# Patient Record
Sex: MALE | Race: WHITE | NOT HISPANIC OR LATINO | Employment: UNEMPLOYED | URBAN - METROPOLITAN AREA
[De-identification: names, ages, dates, MRNs, and addresses within clinical notes are randomized per-mention and may not be internally consistent; named-entity substitution may affect disease eponyms.]

---
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
## 2023-07-26 ENCOUNTER — TELEPHONE (OUTPATIENT)
Age: 18
End: 2023-07-26

## 2023-07-31 NOTE — TELEPHONE ENCOUNTER
07/31/23 1:17 AM        The office's request has been received, reviewed, and the patient chart updated. The PCP has successfully been removed with a patient attribution note. This message will now be completed.         Thank you  Conner Aleman

## 2024-03-07 ENCOUNTER — HOSPITAL ENCOUNTER (EMERGENCY)
Facility: HOSPITAL | Age: 19
Discharge: HOME | End: 2024-03-07
Attending: EMERGENCY MEDICINE
Payer: COMMERCIAL

## 2024-03-07 ENCOUNTER — APPOINTMENT (EMERGENCY)
Dept: RADIOLOGY | Facility: HOSPITAL | Age: 19
End: 2024-03-07
Payer: COMMERCIAL

## 2024-03-07 ENCOUNTER — OFFICE VISIT (OUTPATIENT)
Age: 19
End: 2024-03-07
Payer: COMMERCIAL

## 2024-03-07 VITALS
TEMPERATURE: 97.5 F | DIASTOLIC BLOOD PRESSURE: 92 MMHG | SYSTOLIC BLOOD PRESSURE: 142 MMHG | OXYGEN SATURATION: 97 % | RESPIRATION RATE: 20 BRPM | HEART RATE: 72 BPM

## 2024-03-07 VITALS
DIASTOLIC BLOOD PRESSURE: 89 MMHG | HEART RATE: 70 BPM | OXYGEN SATURATION: 99 % | RESPIRATION RATE: 18 BRPM | TEMPERATURE: 96.7 F | SYSTOLIC BLOOD PRESSURE: 138 MMHG

## 2024-03-07 DIAGNOSIS — I45.6 WOLFF-PARKINSON-WHITE (WPW) PATTERN: ICD-10-CM

## 2024-03-07 DIAGNOSIS — R07.9 CHEST PAIN, UNSPECIFIED TYPE: Primary | ICD-10-CM

## 2024-03-07 DIAGNOSIS — R07.9 CHEST PAIN: Primary | ICD-10-CM

## 2024-03-07 DIAGNOSIS — I45.6 WPW (WOLFF-PARKINSON-WHITE SYNDROME): ICD-10-CM

## 2024-03-07 LAB
2HR DELTA HS TROPONIN: >0 NG/L
ALBUMIN SERPL BCP-MCNC: 4.5 G/DL (ref 3.5–5)
ALP SERPL-CCNC: 73 U/L (ref 34–104)
ALT SERPL W P-5'-P-CCNC: 31 U/L (ref 7–52)
ANION GAP SERPL CALCULATED.3IONS-SCNC: 8 MMOL/L
AST SERPL W P-5'-P-CCNC: 23 U/L (ref 13–39)
ATRIAL RATE: 81 BPM
BASOPHILS # BLD AUTO: 0.07 THOUSANDS/ÂΜL (ref 0–0.1)
BASOPHILS NFR BLD AUTO: 1 % (ref 0–1)
BILIRUB SERPL-MCNC: 0.98 MG/DL (ref 0.2–1)
BUN SERPL-MCNC: 11 MG/DL (ref 5–25)
CALCIUM SERPL-MCNC: 9.8 MG/DL (ref 8.4–10.2)
CARDIAC TROPONIN I PNL SERPL HS: 2 NG/L
CARDIAC TROPONIN I PNL SERPL HS: <2 NG/L
CHLORIDE SERPL-SCNC: 107 MMOL/L (ref 96–108)
CO2 SERPL-SCNC: 23 MMOL/L (ref 21–32)
CREAT SERPL-MCNC: 0.83 MG/DL (ref 0.6–1.3)
D DIMER PPP FEU-MCNC: 0.31 UG/ML FEU
EOSINOPHIL # BLD AUTO: 0.55 THOUSAND/ÂΜL (ref 0–0.61)
EOSINOPHIL NFR BLD AUTO: 7 % (ref 0–6)
ERYTHROCYTE [DISTWIDTH] IN BLOOD BY AUTOMATED COUNT: 12.7 % (ref 11.6–15.1)
GFR SERPL CREATININE-BSD FRML MDRD: 125 ML/MIN/1.73SQ M
GLUCOSE SERPL-MCNC: 83 MG/DL (ref 65–140)
HCT VFR BLD AUTO: 45 % (ref 36.5–49.3)
HGB BLD-MCNC: 15.8 G/DL (ref 12–17)
IMM GRANULOCYTES # BLD AUTO: 0.11 THOUSAND/UL (ref 0–0.2)
IMM GRANULOCYTES NFR BLD AUTO: 1 % (ref 0–2)
LYMPHOCYTES # BLD AUTO: 2.6 THOUSANDS/ÂΜL (ref 0.6–4.47)
LYMPHOCYTES NFR BLD AUTO: 33 % (ref 14–44)
MCH RBC QN AUTO: 29.2 PG (ref 26.8–34.3)
MCHC RBC AUTO-ENTMCNC: 35.1 G/DL (ref 31.4–37.4)
MCV RBC AUTO: 83 FL (ref 82–98)
MONOCYTES # BLD AUTO: 0.54 THOUSAND/ÂΜL (ref 0.17–1.22)
MONOCYTES NFR BLD AUTO: 7 % (ref 4–12)
NEUTROPHILS # BLD AUTO: 4.12 THOUSANDS/ÂΜL (ref 1.85–7.62)
NEUTS SEG NFR BLD AUTO: 51 % (ref 43–75)
NRBC BLD AUTO-RTO: 0 /100 WBCS
P AXIS: 46 DEGREES
PLATELET # BLD AUTO: 309 THOUSANDS/UL (ref 149–390)
PMV BLD AUTO: 9.9 FL (ref 8.9–12.7)
POTASSIUM SERPL-SCNC: 3.9 MMOL/L (ref 3.5–5.3)
PR INTERVAL: 96 MS
PROT SERPL-MCNC: 7.7 G/DL (ref 6.4–8.4)
QRS AXIS: 3 DEGREES
QRSD INTERVAL: 104 MS
QT INTERVAL: 382 MS
QTC INTERVAL: 443 MS
RBC # BLD AUTO: 5.41 MILLION/UL (ref 3.88–5.62)
SODIUM SERPL-SCNC: 138 MMOL/L (ref 135–147)
T WAVE AXIS: 43 DEGREES
VENTRICULAR RATE: 81 BPM
WBC # BLD AUTO: 7.99 THOUSAND/UL (ref 4.31–10.16)

## 2024-03-07 PROCEDURE — 85025 COMPLETE CBC W/AUTO DIFF WBC: CPT | Performed by: EMERGENCY MEDICINE

## 2024-03-07 PROCEDURE — 71045 X-RAY EXAM CHEST 1 VIEW: CPT

## 2024-03-07 PROCEDURE — 36415 COLL VENOUS BLD VENIPUNCTURE: CPT

## 2024-03-07 PROCEDURE — 93005 ELECTROCARDIOGRAM TRACING: CPT

## 2024-03-07 PROCEDURE — 99213 OFFICE O/P EST LOW 20 MIN: CPT | Performed by: PHYSICIAN ASSISTANT

## 2024-03-07 PROCEDURE — 80053 COMPREHEN METABOLIC PANEL: CPT | Performed by: EMERGENCY MEDICINE

## 2024-03-07 PROCEDURE — 84484 ASSAY OF TROPONIN QUANT: CPT | Performed by: EMERGENCY MEDICINE

## 2024-03-07 PROCEDURE — 96372 THER/PROPH/DIAG INJ SC/IM: CPT

## 2024-03-07 PROCEDURE — 99284 EMERGENCY DEPT VISIT MOD MDM: CPT | Performed by: EMERGENCY MEDICINE

## 2024-03-07 PROCEDURE — 99285 EMERGENCY DEPT VISIT HI MDM: CPT

## 2024-03-07 PROCEDURE — 85379 FIBRIN DEGRADATION QUANT: CPT | Performed by: EMERGENCY MEDICINE

## 2024-03-07 PROCEDURE — 93010 ELECTROCARDIOGRAM REPORT: CPT | Performed by: INTERNAL MEDICINE

## 2024-03-07 RX ORDER — KETOROLAC TROMETHAMINE 30 MG/ML
30 INJECTION, SOLUTION INTRAMUSCULAR; INTRAVENOUS ONCE
Status: COMPLETED | OUTPATIENT
Start: 2024-03-07 | End: 2024-03-07

## 2024-03-07 RX ADMIN — KETOROLAC TROMETHAMINE 30 MG: 30 INJECTION, SOLUTION INTRAMUSCULAR; INTRAVENOUS at 15:06

## 2024-03-07 NOTE — Clinical Note
Dedrick Cazares was seen and treated in our emergency department on 3/7/2024.    No restrictions            Diagnosis:     Dedrick  may return to work on return date.    He may return on this date: 03/11/2024         If you have any questions or concerns, please don't hesitate to call.      Gisela Vazquez, DO    ______________________________           _______________          _______________  Hospital Representative                              Date                                Time

## 2024-03-07 NOTE — ED PROVIDER NOTES
History  Chief Complaint   Patient presents with    Chest Pain     Patient reports chest pain and shortness of breath for the last day. Patient reports he is otherwise healthy individual. Patient sent in by urgent care.      20 y/o male presents to the ED for chest pain and sob since yesterday. 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 States that he was clearing brush when he developed pain in the center of his chest. States that it was a sharp/ stabbing pain with associated sob. States that it lasted about 20 mins and then resolved. He states that it has been intermittent since. Not associated with exertion. Does state that he lifts heavy brush at work so unsure if he strained a muscle. Denies any fever, cough, congestion, sore throat, urinary symptoms, or d/c. Has not tried anything for the symptoms. States that it went to urgent care today and sent here for abnormal EKG with WPW. Denies any cardiac hx or smoking hx. States that his uncle had a pacemaker placed at 16 but is unsure why.       History provided by:  Patient  Chest Pain  Pain location:  Substernal area  Pain radiates to:  Does not radiate  Onset quality:  Sudden  Duration:  1 day  Timing:  Intermittent  Progression:  Improving  Chronicity:  New  Relieved by:  None tried  Worsened by:  Nothing tried  Ineffective treatments:  None tried  Associated symptoms: shortness of breath    Associated symptoms: no abdominal pain, no back pain, no cough, no fever, no headache, no nausea, no numbness, not vomiting and no weakness    Risk factors: no coronary artery disease, no hypertension and no smoking        Prior to Admission Medications   Prescriptions Last Dose Informant Patient Reported? Taking?   Benzoyl Peroxide (BENZOYL PEROXIDE CLEANSER) 9 % LOTN   No No   Sig: Cleanse back daily   cetirizine (ZyrTEC) 10 mg tablet   Yes No   Sig: Take 10 mg by mouth as needed    fluticasone (FLONASE) 50 mcg/act nasal spray  Self No No   Si  spray into each nostril daily   Patient taking differently: 1 spray into each nostril daily as needed        Facility-Administered Medications: None       Past Medical History:   Diagnosis Date    Allergic rhinitis        Past Surgical History:   Procedure Laterality Date    CIRCUMCISION      HYDROCELE EXCISION / REPAIR  2011       Family History   Problem Relation Age of Onset    Asthma Mother     Allergy (severe) Mother     Psoriasis Mother     Obesity Mother         had gastric sleeve    No Known Problems Father     Allergy (severe) Sister     Allergy (severe) Brother     Heart disease Maternal Grandmother     Hypertension Maternal Grandmother     Asthma Maternal Grandfather     Cancer Maternal Grandfather     COPD Maternal Grandfather     Hypertension Paternal Grandmother     Hypertension Paternal Grandfather     Heart disease Paternal Uncle     Hypertension Paternal Uncle     Hyperlipidemia Paternal Uncle      I have reviewed and agree with the history as documented.    E-Cigarette/Vaping    E-Cigarette Use Never User      E-Cigarette/Vaping Substances    Nicotine No      Social History     Tobacco Use    Smoking status: Never    Smokeless tobacco: Never   Vaping Use    Vaping status: Never Used   Substance Use Topics    Alcohol use: Never    Drug use: Never       Review of Systems   Constitutional:  Negative for chills and fever.   HENT:  Negative for congestion, ear pain and sore throat.    Eyes:  Negative for pain and visual disturbance.   Respiratory:  Positive for shortness of breath. Negative for cough and wheezing.    Cardiovascular:  Positive for chest pain. Negative for leg swelling.   Gastrointestinal:  Negative for abdominal pain, diarrhea, nausea and vomiting.   Genitourinary:  Negative for dysuria, frequency, hematuria and urgency.   Musculoskeletal:  Negative for back pain, neck pain and neck stiffness.   Skin:  Negative for rash and wound.   Neurological:  Negative for weakness, numbness and  headaches.   Psychiatric/Behavioral:  Negative for agitation and confusion.    All other systems reviewed and are negative.      Physical Exam  Physical Exam  Vitals and nursing note reviewed.   Constitutional:       Appearance: He is well-developed.   HENT:      Head: Normocephalic and atraumatic.   Eyes:      Pupils: Pupils are equal, round, and reactive to light.   Cardiovascular:      Rate and Rhythm: Normal rate and regular rhythm.   Pulmonary:      Effort: Pulmonary effort is normal.      Breath sounds: Normal breath sounds.   Chest:      Chest wall: Tenderness present.   Abdominal:      General: Bowel sounds are normal.      Palpations: Abdomen is soft.   Musculoskeletal:         General: Normal range of motion.      Cervical back: Normal range of motion and neck supple.   Skin:     General: Skin is warm and dry.   Neurological:      General: No focal deficit present.      Mental Status: He is alert and oriented to person, place, and time.      Comments: No focal deficits         Vital Signs  ED Triage Vitals   Temperature Pulse Respirations Blood Pressure SpO2   03/07/24 1249 03/07/24 1249 03/07/24 1249 03/07/24 1249 03/07/24 1249   97.5 °F (36.4 °C) 79 16 159/98 97 %      Temp Source Heart Rate Source Patient Position - Orthostatic VS BP Location FiO2 (%)   03/07/24 1249 03/07/24 1249 03/07/24 1249 03/07/24 1249 --   Oral Monitor Sitting Left arm       Pain Score       03/07/24 1506       5           Vitals:    03/07/24 1249 03/07/24 1400 03/07/24 1600   BP: 159/98 147/80 142/92   Pulse: 79 67 72   Patient Position - Orthostatic VS: Sitting Sitting Sitting         Visual Acuity      ED Medications  Medications   ketorolac (TORADOL) injection 30 mg (30 mg Intramuscular Given 3/7/24 1506)       Diagnostic Studies  Results Reviewed       Procedure Component Value Units Date/Time    HS Troponin I 2hr [[id number]]  (Normal) Collected: 03/07/24 1501    Lab Status: Final result Specimen: Blood from Arm, Right  Updated: 03/07/24 1532     hs TnI 2hr 2 ng/L      Delta 2hr hsTnI >0 ng/L     D-dimer, quantitative [[id number]]  (Normal) Collected: 03/07/24 1249    Lab Status: Final result Specimen: Blood from Arm, Right Updated: 03/07/24 1403     D-Dimer, Quant 0.31 ug/ml FEU     HS Troponin 0hr (reflex protocol) [[id number]]  (Normal) Collected: 03/07/24 1249    Lab Status: Final result Specimen: Blood from Arm, Right Updated: 03/07/24 1318     hs TnI 0hr <2 ng/L     Comprehensive metabolic panel [[id number]] Collected: 03/07/24 1249    Lab Status: Final result Specimen: Blood from Arm, Right Updated: 03/07/24 1311     Sodium 138 mmol/L      Potassium 3.9 mmol/L      Chloride 107 mmol/L      CO2 23 mmol/L      ANION GAP 8 mmol/L      BUN 11 mg/dL      Creatinine 0.83 mg/dL      Glucose 83 mg/dL      Calcium 9.8 mg/dL      AST 23 U/L      ALT 31 U/L      Alkaline Phosphatase 73 U/L      Total Protein 7.7 g/dL      Albumin 4.5 g/dL      Total Bilirubin 0.98 mg/dL      eGFR 125 ml/min/1.73sq m     Narrative:      National Kidney Disease Foundation guidelines for Chronic Kidney Disease (CKD):     Stage 1 with normal or high GFR (GFR > 90 mL/min/1.73 square meters)    Stage 2 Mild CKD (GFR = 60-89 mL/min/1.73 square meters)    Stage 3A Moderate CKD (GFR = 45-59 mL/min/1.73 square meters)    Stage 3B Moderate CKD (GFR = 30-44 mL/min/1.73 square meters)    Stage 4 Severe CKD (GFR = 15-29 mL/min/1.73 square meters)    Stage 5 End Stage CKD (GFR <15 mL/min/1.73 square meters)  Note: GFR calculation is accurate only with a steady state creatinine    CBC and differential [[id number]]  (Abnormal) Collected: 03/07/24 1249    Lab Status: Final result Specimen: Blood from Arm, Right Updated: 03/07/24 1301     WBC 7.99 Thousand/uL      RBC 5.41 Million/uL      Hemoglobin 15.8 g/dL      Hematocrit 45.0 %      MCV 83 fL      MCH 29.2 pg      MCHC 35.1 g/dL      RDW 12.7 %      MPV 9.9 fL      Platelets 309 Thousands/uL      nRBC 0 /100 WBCs       Neutrophils Relative 51 %      Immat GRANS % 1 %      Lymphocytes Relative 33 %      Monocytes Relative 7 %      Eosinophils Relative 7 %      Basophils Relative 1 %      Neutrophils Absolute 4.12 Thousands/µL      Immature Grans Absolute 0.11 Thousand/uL      Lymphocytes Absolute 2.60 Thousands/µL      Monocytes Absolute 0.54 Thousand/µL      Eosinophils Absolute 0.55 Thousand/µL      Basophils Absolute 0.07 Thousands/µL                    XR chest 1 view portable   Final Result by Matty Dodd MD (03/07 1418)      No acute cardiopulmonary disease.            Resident: COURTNEY CISNEROS I, the attending radiologist, have reviewed the images and agree with the final report above.      Workstation performed: [id number]                    Procedures  ECG 12 Lead Documentation Only    Date/Time: 3/7/2024 1:45 PM    Performed by: Gisela Vazquez DO  Authorized by: Gisela Vazquez DO    Indications / Diagnosis:  Chest pain and sob  Patient location:  ED  Rate:     ECG rate:  81    ECG rate assessment: normal    Rhythm:     Rhythm: sinus rhythm    Ectopy:     Ectopy: none    QRS:     QRS axis:  Normal    QRS intervals:  Normal  ST segments:     ST segments:  Normal  T waves:     T waves: normal    Comments:      WPW   ECG 12 Lead Documentation Only    Date/Time: 3/7/2024 3:46 PM    Performed by: Gisela Vazquez DO  Authorized by: Gisela Vazquez DO    Indications / Diagnosis:  Delta  Patient location:  ED  Rate:     ECG rate:  69    ECG rate assessment: normal    Rhythm:     Rhythm: sinus rhythm    Ectopy:     Ectopy: none    QRS:     QRS axis:  Normal  Conduction:     Conduction: normal    ST segments:     ST segments:  Normal  T waves:     T waves: normal             ED Course  ED Course as of 03/07/24 1950   Thu Mar 07, 2024   1342 Chest pain and sob - yesterday around 10a - working pulling brush for trees. Stabbing. Intermittent. 20 mins. Eating dinner. Sitting in truck. Sob with it. Center of chest. Does   heavy brush at work so may have injured or strained a muscle. Uncle had a pacemaker put in early in life in early 20s./    1438 Spoke with Dr Solomon from cards- states nothing to do further for WPW if not in svt. Recommends outpatient follow up for wpw ep referral              HEART Risk Score      Flowsheet Row Most Recent Value   Heart Score Risk Calculator    History 0 Filed at: 03/07/2024 1549   ECG 0 Filed at: 03/07/2024 1549   Age 0 Filed at: 03/07/2024 1549   Risk Factors 1 Filed at: 03/07/2024 1549   Troponin 0 Filed at: 03/07/2024 1549   HEART Score 1 Filed at: 03/07/2024 1549                          SBIRT 20yo+      Flowsheet Row Most Recent Value   Initial Alcohol Screen: US AUDIT-C     1. How often do you have a drink containing alcohol? 0 Filed at: 03/07/2024 1432   2. How many drinks containing alcohol do you have on a typical day you are drinking?  0 Filed at: 03/07/2024 1432   3a. Male UNDER 65: How often do you have five or more drinks on one occasion? 0 Filed at: 03/07/2024 1432   Audit-C Score 0 Filed at: 03/07/2024 1432   LEODAN: How many times in the past year have you...    Used an illegal drug or used a prescription medication for non-medical reasons? Never Filed at: 03/07/2024 1432                      Medical Decision Making  20 y/o male presents to the ED for chest pain and sob - will get cardiac workup, EKG, trop, ddimer, and cxr. Will also speak to cards about wpw on EKG.    Spoke with Dr Solomon, recommends outpatient follow up for wpw EP referral.     Amount and/or Complexity of Data Reviewed  Labs: ordered.  Radiology: ordered.    Risk  Prescription drug management.             Disposition  Final diagnoses:   Chest pain   WPW (Rubio-Parkinson-White syndrome)     Time reflects when diagnosis was documented in both MDM as applicable and the Disposition within this note       Time User Action Codes Description Comment    3/7/2024  2:40 PM Gisela Vazquez Add [R07.9] Chest pain      3/7/2024  2:40 PM Gisela Vazquez Add [I45.6] WPW (Rubio-Parkinson-White syndrome)           ED Disposition       ED Disposition   Discharge    Condition   Stable    Date/Time   Thu Mar 7, 2024 1536    Comment   Dedrick Cazares discharge to home/self care.                   Follow-up Information       Follow up With Specialties Details Why Contact Info Additional Information    [facility] Cardiology Call in 1 day for follow up within 1 week [address]  [address]  [facility] [address] [phone] [facility], [address], [facility] [address], [place], [place], [address] [phone]    [facility] Emergency Medicine Go to  immediately for any new or wrosening symptoms, as discussed [address] [facility] [address] [phone] [facility], [address] [place], [place], [address]            Discharge Medication List as of 3/7/2024  3:54 PM        CONTINUE these medications which have NOT CHANGED    Details   Benzoyl Peroxide (BENZOYL PEROXIDE CLEANSER) 9 % LOTN Cleanse back daily, Normal      cetirizine (ZyrTEC) 10 mg tablet Take 10 mg by mouth as needed , Historical Med      fluticasone (FLONASE) 50 mcg/act nasal spray 1 spray into each nostril daily, Starting Wed 10/28/2020, Normal                 PDMP Review       None            ED Provider  Electronically Signed by             Gisela Vazquez DO  03/07/24 1951

## 2024-03-07 NOTE — PROGRESS NOTES
[facility]        NAME: Dedrick Cazares is a 19 y.o. male  : 2005    MRN: [id number]  DATE: 2024  TIME: 12:31 PM    Assessment and Plan   Chest pain, unspecified type [R07.9]  1. Chest pain, unspecified type  Transfer to other facility      2. Rubio-Parkinson-White (WPW) pattern  Transfer to other facility            Patient Instructions     Discussed ECG findings indicating with Parkinson.  Briefly discussed or Parkinson syndrome.  Recommend being transferred to the ED via ambulance however the patient declined and reported that he will drive himself to the [facility] ED.    Inform patient the risk of self driving to the emergency department in light of chest pain with shortness of breath such as losing consciousness while driving.    Patient nevertheless, declined and will drive himself to the emergency department.    VSS, In no acute cardiopulmonary distress.      Chief Complaint     Chief Complaint   Patient presents with    Chest Pain     Patient stated he had chest pain yesterday and today having a hard time breathing. C/o fatigue.          History of Present Illness       19-year-old male is presenting today with complaint of chest pain with shortness of breath in the last 48 hours.  Denies a past medical history of cardiac issues.  Denies family history of cardiac issues as well.  Patient was at work yesterday while he began to experience intermitting chest pain and occasional shortness of breath.  He currently denies dizziness, lightheadedness, weakness fatigue or nausea or vomiting.        Review of Systems   Review of Systems   Constitutional:  Negative for fever.   Respiratory:  Negative for cough.    Cardiovascular:  Positive for chest pain and palpitations.   Gastrointestinal:  Negative for nausea and vomiting.   Neurological:  Negative for dizziness, weakness, light-headedness, numbness and headaches.         Current Medications       Current Outpatient  Medications:     Benzoyl Peroxide (BENZOYL PEROXIDE CLEANSER) 9 % LOTN, Cleanse back daily, Disp: 1 Bottle, Rfl: 6    cetirizine (ZyrTEC) 10 mg tablet, Take 10 mg by mouth as needed , Disp: , Rfl:     fluticasone (FLONASE) 50 mcg/act nasal spray, 1 spray into each nostril daily (Patient taking differently: 1 spray into each nostril daily as needed  ), Disp: 16 g, Rfl: 6    Current Allergies     Allergies as of 03/07/2024 - Reviewed 03/07/2024   Allergen Reaction Noted    Amoxicillin Rash 07/08/2018            The following portions of the patient's history were reviewed and updated as appropriate: allergies, current medications, past family history, past medical history, past social history, past surgical history and problem list.     Past Medical History:   Diagnosis Date    Allergic rhinitis        Past Surgical History:   Procedure Laterality Date    CIRCUMCISION      HYDROCELE EXCISION / REPAIR  2011       Family History   Problem Relation Age of Onset    Asthma Mother     Allergy (severe) Mother     Psoriasis Mother     Obesity Mother         had gastric sleeve    No Known Problems Father     Allergy (severe) Sister     Allergy (severe) Brother     Heart disease Maternal Grandmother     Hypertension Maternal Grandmother     Asthma Maternal Grandfather     Cancer Maternal Grandfather     COPD Maternal Grandfather     Hypertension Paternal Grandmother     Hypertension Paternal Grandfather     Heart disease Paternal Uncle     Hypertension Paternal Uncle     Hyperlipidemia Paternal Uncle          Medications have been verified.        Objective   /89   Pulse 70   Temp (!) 96.7 °F (35.9 °C)   Resp 18   SpO2 99%        Physical Exam     Physical Exam  Vitals and nursing note reviewed.   Constitutional:       General: He is not in acute distress.     Appearance: He is well-developed. He is not ill-appearing, toxic-appearing or diaphoretic.   Eyes:      Extraocular Movements: Extraocular movements intact.       Pupils: Pupils are equal, round, and reactive to light.   Neck:      Vascular: No JVD.      Trachea: No tracheal deviation.   Cardiovascular:      Rate and Rhythm: Normal rate and regular rhythm.      Pulses:           Radial pulses are 2+ on the right side and 2+ on the left side.      Heart sounds: Normal heart sounds.      No friction rub. No gallop. No S3 sounds.   Pulmonary:      Effort: Pulmonary effort is normal. No tachypnea or respiratory distress.   Musculoskeletal:         General: Normal range of motion.      Cervical back: Normal range of motion and neck supple.   Skin:     General: Skin is warm and dry.   Neurological:      General: No focal deficit present.      Mental Status: He is alert and oriented to person, place, and time.   Psychiatric:         Mood and Affect: Mood normal.         Behavior: Behavior normal.

## 2024-03-08 LAB
ATRIAL RATE: 69 BPM
ATRIAL RATE: 75 BPM
P AXIS: -19 DEGREES
P AXIS: 16 DEGREES
PR INTERVAL: 100 MS
PR INTERVAL: 92 MS
QRS AXIS: -5 DEGREES
QRS AXIS: 10 DEGREES
QRSD INTERVAL: 100 MS
QRSD INTERVAL: 98 MS
QT INTERVAL: 390 MS
QT INTERVAL: 404 MS
QTC INTERVAL: 432 MS
QTC INTERVAL: 435 MS
T WAVE AXIS: 43 DEGREES
T WAVE AXIS: 67 DEGREES
VENTRICULAR RATE: 69 BPM
VENTRICULAR RATE: 75 BPM

## 2024-03-08 PROCEDURE — 93010 ELECTROCARDIOGRAM REPORT: CPT | Performed by: INTERNAL MEDICINE

## 2024-04-22 ENCOUNTER — CONSULT (OUTPATIENT)
Dept: CARDIOLOGY CLINIC | Facility: CLINIC | Age: 19
End: 2024-04-22
Payer: COMMERCIAL

## 2024-04-22 VITALS
OXYGEN SATURATION: 97 % | RESPIRATION RATE: 16 BRPM | HEIGHT: 65 IN | HEART RATE: 76 BPM | SYSTOLIC BLOOD PRESSURE: 126 MMHG | DIASTOLIC BLOOD PRESSURE: 88 MMHG | BODY MASS INDEX: 40.65 KG/M2 | WEIGHT: 244 LBS

## 2024-04-22 DIAGNOSIS — I45.6 WPW (WOLFF-PARKINSON-WHITE SYNDROME): Primary | ICD-10-CM

## 2024-04-22 PROCEDURE — 99203 OFFICE O/P NEW LOW 30 MIN: CPT | Performed by: INTERNAL MEDICINE

## 2024-04-22 PROCEDURE — 93000 ELECTROCARDIOGRAM COMPLETE: CPT | Performed by: INTERNAL MEDICINE

## 2024-04-22 RX ORDER — LORATADINE 10 MG/1
10 TABLET ORAL DAILY
COMMUNITY
End: 2024-04-22 | Stop reason: ALTCHOICE

## 2024-04-22 NOTE — PROGRESS NOTES
[facility]   Office Consultation    Dedrick Cazares 19 y.o. male MRN: [id number]    04/22/24          Assessment:  WPW pattern  Palpitations   Chest pain       Plan:  He appears to be persistently pre-excited on review of ECGs. He has also noted palpitations. Will evaluate with an exercise stress test and 1 week event monitor. Will also refer to EP to establish care as he may need EPS/ablation.       Follow up: 6 months or sooner as needed    1. WPW (Rubio-Parkinson-White syndrome)  Ambulatory Referral to Cardiac Electrophysiology    Ambulatory Referral to Cardiac Electrophysiology    Stress test only, exercise    Echo complete w/ contrast if indicated    AMB event recorder          HPI: Dedrick Cazares is a 19 y.o. year old male who was referred by Dr. Vazquez to establish care.    He denies past cardiac history.  He recently presented to the ED with chest discomfort.  ECG revealed WPW pattern.  There was no evidence of SVT and he was discharged with outpatient follow-up.  On presentation today he notes intermittent episodes of palpitations.  He denies lightheadedness or syncope. He also notes intermittent chest discomfort. He denies any other cardiac concerns at this time.    ECG personally reviewed: NSR, WPW    Family History: uncle with pacemaker; father with hypertension     Social history: denies tobacco, alcohol, and recreational drug use      Allergies   Allergen Reactions    Amoxicillin Rash         Current Outpatient Medications:     fluticasone (FLONASE) 50 mcg/act nasal spray, 1 spray into each nostril daily (Patient taking differently: 1 spray into each nostril daily as needed  ), Disp: 16 g, Rfl: 6    Past Medical History:   Diagnosis Date    Allergic rhinitis        Family History   Problem Relation Age of Onset    Asthma Mother     Allergy (severe) Mother     Psoriasis Mother     Obesity Mother         had gastric sleeve    No Known Problems Father     Allergy (severe) Sister     Allergy (severe)  Brother     Heart disease Maternal Grandmother     Hypertension Maternal Grandmother     Asthma Maternal Grandfather     Cancer Maternal Grandfather     COPD Maternal Grandfather     Hypertension Paternal Grandmother     Hypertension Paternal Grandfather     Heart disease Paternal Uncle     Hypertension Paternal Uncle     Hyperlipidemia Paternal Uncle        Past Surgical History:   Procedure Laterality Date    CIRCUMCISION      HYDROCELE EXCISION / REPAIR  2011       Social History     Socioeconomic History    Marital status: Single     Spouse name: Not on file    Number of children: Not on file    Years of education: Not on file    Highest education level: Not on file   Occupational History    Not on file   Tobacco Use    Smoking status: Never    Smokeless tobacco: Never   Vaping Use    Vaping status: Never Used   Substance and Sexual Activity    Alcohol use: Never    Drug use: Never    Sexual activity: Never   Other Topics Concern    Not on file   Social History Narrative    Lives with mom,  dad, sister and  brother    Smoke/CO detectors in home     Pets- 4 cats and 1 dog    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   HENT:  Negative for congestion.    Cardiovascular:  Positive for chest pain and palpitations. Negative for dyspnea on exertion, irregular heartbeat, leg swelling, near-syncope, orthopnea, paroxysmal nocturnal dyspnea and syncope.   Respiratory:  Negative for shortness of breath, sleep disturbances due to breathing and snoring.    Hematologic/Lymphatic: Does not bruise/bleed  "easily.   Skin:  Negative for rash.   Musculoskeletal:  Negative for myalgias.   Gastrointestinal:  Negative for nausea and vomiting.   Neurological:  Negative for excessive daytime sleepiness and light-headedness.   Psychiatric/Behavioral:  The patient is not nervous/anxious.        Vitals: /88 (BP Location: Right arm, Patient Position: Sitting, Cuff Size: Standard)   Pulse 76   Resp 16   Ht 5' 5\" (1.651 m)   Wt 111 kg (244 lb)   SpO2 97%   BMI 40.60 kg/m²       Physical Exam:     GEN: Alert and oriented x 3, in no acute distress.  Well appearing and well nourished.   HEENT: Sclera anicteric, conjunctivae pink, mucous membranes moist. Oropharynx clear.   NECK: Supple, no carotid bruits, no significant JVD. Trachea midline, no thyromegaly.   HEART: Regular rhythm, normal S1 and S2, no murmurs, clicks, gallops or rubs. PMI nondisplaced, no thrills.   LUNGS: Clear to auscultation bilaterally; no wheezes, rales, or rhonchi. No increased work of breathing or signs of respiratory distress.   ABDOMEN: Soft, nontender, nondistended, normoactive bowel sounds.   EXTREMITIES: Skin warm and well perfused, no clubbing, cyanosis, or edema.  NEURO: No focal findings. Normal speech. Mood and affect normal.   SKIN: Normal without suspicious lesions on exposed skin.    "

## 2024-04-30 ENCOUNTER — CLINICAL SUPPORT (OUTPATIENT)
Dept: CARDIOLOGY CLINIC | Facility: CLINIC | Age: 19
End: 2024-04-30
Payer: COMMERCIAL

## 2024-04-30 DIAGNOSIS — I45.6 WPW (WOLFF-PARKINSON-WHITE SYNDROME): ICD-10-CM

## 2024-04-30 PROCEDURE — 93272 ECG/REVIEW INTERPRET ONLY: CPT | Performed by: INTERNAL MEDICINE

## 2024-04-30 NOTE — RESULT ENCOUNTER NOTE
[facility]    Event Recorder Results    Indication: WPW    Duration of monitoring: 3d 5h 43m    Results:   Predominant underlying rhythm: Normal sinus rhythm  Average heart rate: 89 bpm  Ventricular preexcitation was predominantly noted.   Episodes of tachycardia were noted.  Most appeared consistent with sinus tachycardia however SVT cannot be definitively excluded.  Onset and termination were not noted.

## 2024-05-01 NOTE — RESULT ENCOUNTER NOTE
Please let him know that he was in normal sinus rhythm with WPW which was known. No changes at this time. Keep his upcoming appt with EP.

## 2024-05-03 ENCOUNTER — TELEPHONE (OUTPATIENT)
Dept: CARDIOLOGY CLINIC | Facility: CLINIC | Age: 19
End: 2024-05-03

## 2024-05-03 NOTE — TELEPHONE ENCOUNTER
----- Message from Juanito Sheridan MD sent at 5/1/2024  2:29 PM EDT -----  Please let him know that he was in normal sinus rhythm with WPW which was known. No changes at this time. Keep his upcoming appt with EP.

## 2024-05-07 ENCOUNTER — HOSPITAL ENCOUNTER (OUTPATIENT)
Dept: NON INVASIVE DIAGNOSTICS | Facility: CLINIC | Age: 19
Discharge: HOME | End: 2024-05-07
Payer: COMMERCIAL

## 2024-05-07 VITALS
HEART RATE: 69 BPM | WEIGHT: 244 LBS | DIASTOLIC BLOOD PRESSURE: 88 MMHG | HEIGHT: 65 IN | SYSTOLIC BLOOD PRESSURE: 126 MMHG | BODY MASS INDEX: 40.65 KG/M2

## 2024-05-07 DIAGNOSIS — I45.6 WPW (WOLFF-PARKINSON-WHITE SYNDROME): ICD-10-CM

## 2024-05-07 LAB
AORTIC ROOT: 3.4 CM
APICAL FOUR CHAMBER EJECTION FRACTION: 58 %
ASCENDING AORTA: 3.5 CM
BSA FOR ECHO PROCEDURE: 2.15 M2
E WAVE DECELERATION TIME: 176 MS
E/A RATIO: 1.47
FRACTIONAL SHORTENING: 29 (ref 28–44)
INTERVENTRICULAR SEPTUM IN DIASTOLE (PARASTERNAL SHORT AXIS VIEW): 1 CM
INTERVENTRICULAR SEPTUM: 1 CM (ref 0.6–1.1)
LAAS-AP2: 17 CM2
LAAS-AP4: 16.3 CM2
LEFT ATRIUM SIZE: 3.7 CM
LEFT ATRIUM VOLUME (MOD BIPLANE): 49 ML
LEFT ATRIUM VOLUME INDEX (MOD BIPLANE): 22.8 ML/M2
LEFT INTERNAL DIMENSION IN SYSTOLE: 3.4 CM (ref 2.1–4)
LEFT VENTRICULAR INTERNAL DIMENSION IN DIASTOLE: 4.8 CM (ref 3.5–6)
LEFT VENTRICULAR POSTERIOR WALL IN END DIASTOLE: 1 CM
LEFT VENTRICULAR STROKE VOLUME: 58 ML
LVSV (TEICH): 58 ML
MV E'TISSUE VEL-SEP: 11 CM/S
MV PEAK A VEL: 0.62 M/S
MV PEAK E VEL: 91 CM/S
RIGHT ATRIUM AREA SYSTOLE A4C: 12.3 CM2
RIGHT VENTRICLE ID DIMENSION: 2.8 CM
SL CV LEFT ATRIUM LENGTH A2C: 4.5 CM
SL CV LV EF: 60
SL CV PED ECHO LEFT VENTRICLE DIASTOLIC VOLUME (MOD BIPLANE) 2D: 105 ML
SL CV PED ECHO LEFT VENTRICLE SYSTOLIC VOLUME (MOD BIPLANE) 2D: 47 ML
TRICUSPID ANNULAR PLANE SYSTOLIC EXCURSION: 2 CM

## 2024-05-07 PROCEDURE — 93306 TTE W/DOPPLER COMPLETE: CPT | Performed by: INTERNAL MEDICINE

## 2024-05-07 PROCEDURE — 93306 TTE W/DOPPLER COMPLETE: CPT

## 2024-05-08 ENCOUNTER — HOSPITAL ENCOUNTER (OUTPATIENT)
Dept: NON INVASIVE DIAGNOSTICS | Facility: CLINIC | Age: 19
Discharge: HOME | End: 2024-05-08
Payer: COMMERCIAL

## 2024-05-08 ENCOUNTER — TELEPHONE (OUTPATIENT)
Dept: CARDIOLOGY CLINIC | Facility: CLINIC | Age: 19
End: 2024-05-08

## 2024-05-08 VITALS
DIASTOLIC BLOOD PRESSURE: 102 MMHG | OXYGEN SATURATION: 98 % | BODY MASS INDEX: 40.65 KG/M2 | SYSTOLIC BLOOD PRESSURE: 158 MMHG | HEART RATE: 81 BPM | HEIGHT: 65 IN | WEIGHT: 244 LBS

## 2024-05-08 DIAGNOSIS — I45.6 WPW (WOLFF-PARKINSON-WHITE SYNDROME): ICD-10-CM

## 2024-05-08 LAB
CHEST PAIN STATEMENT: NORMAL
MAX DIASTOLIC BP: 106 MMHG
MAX HR PERCENT: 89 %
MAX HR: 179 BPM
MAX PREDICTED HEART RATE: 199 BPM
PROTOCOL NAME: NORMAL
RATE PRESSURE PRODUCT: NORMAL
SL CV STRESS RECOVERY BP: NORMAL MMHG
SL CV STRESS RECOVERY HR: 94 BPM
SL CV STRESS RECOVERY O2 SAT: 97 %
SL CV STRESS STAGE REACHED: 2
STRESS ANGINA INDEX: 0
STRESS BASELINE BP: NORMAL MMHG
STRESS BASELINE HR: 81 BPM
STRESS O2 SAT REST: 98 %
STRESS PEAK HR: 179 BPM
STRESS POST ESTIMATED WORKLOAD: 7 METS
STRESS POST EXERCISE DUR MIN: 6 MIN
STRESS POST EXERCISE DUR MIN: 6 MIN
STRESS POST EXERCISE DUR SEC: 0 SEC
STRESS POST O2 SAT PEAK: 98 %
STRESS POST PEAK BP: 176 MMHG
STRESS POST PEAK HR: 179 BPM
STRESS POST PEAK SYSTOLIC BP: 176 MMHG
TARGET HR FORMULA: NORMAL
TEST INDICATION: NORMAL

## 2024-05-08 PROCEDURE — 93018 CV STRESS TEST I&R ONLY: CPT | Performed by: INTERNAL MEDICINE

## 2024-05-08 PROCEDURE — 93016 CV STRESS TEST SUPVJ ONLY: CPT | Performed by: INTERNAL MEDICINE

## 2024-05-08 PROCEDURE — 93017 CV STRESS TEST TRACING ONLY: CPT

## 2024-05-09 LAB
CHEST PAIN STATEMENT: NORMAL
MAX DIASTOLIC BP: 106 MMHG
MAX PREDICTED HEART RATE: 199 BPM
PROTOCOL NAME: NORMAL
STRESS POST EXERCISE DUR MIN: 6 MIN
STRESS POST EXERCISE DUR SEC: 0 SEC
STRESS POST PEAK HR: 179 BPM
STRESS POST PEAK SYSTOLIC BP: 176 MMHG
TARGET HR FORMULA: NORMAL
TEST INDICATION: NORMAL

## 2024-05-10 ENCOUNTER — TELEPHONE (OUTPATIENT)
Dept: CARDIOLOGY CLINIC | Facility: CLINIC | Age: 19
End: 2024-05-10

## 2024-05-10 NOTE — TELEPHONE ENCOUNTER
----- Message from Juanito Sheridan MD sent at 5/10/2024  3:10 PM EDT -----  Please let him know that there was no significant arrhythmia on his stress test. It will be reviewed in detail at his upcoming appt with EP.

## 2024-05-10 NOTE — RESULT ENCOUNTER NOTE
Please let him know that there was no significant arrhythmia on his stress test. It will be reviewed in detail at his upcoming appt with EP.

## 2024-06-14 NOTE — PROGRESS NOTES
Consultation - Electrophysiology-Cardiology (EP)   Dedrick Cazares 19 y.o. male MRN: [id number]  Unit/Bed#:  Encounter: [id number]      1. Rubio-Parkinson-White (WPW) pattern  POCT ECG      2. GERD without esophagitis  Ambulatory referral to Gastroenterology      3. Chest pain, unspecified type  Ambulatory referral to Gastroenterology      4. BMI (body mass index), pediatric, greater than 99% for age              Consults  Physician Requesting Consult: Juanito Sheridan MD   Reason for Consult / Principal Problem: WPW pattern       Summary of my recommendation for the patient  WPW pattern-no tachycardia documented-on stress testing no antegrade conduction over pathway at higher heart rates -no intervention planned at this time  If situation changes and he has significant tachycardia, will consider medication/ablation    Chest pain-happens occasionally when he is bending over and cutting shrubs -can work through it  many times, stress test is negative for ischemia -referral to GI for evaluation of GERD    Obesity-BMI 41-needs dietary changes, follow-up with GI          Medical conditions  WPW pattern  Chest pain  Obesity  Suspected GERD/esophageal spasm          Assessment & Plan     WPW pattern  Patient does give a history of incidentally identified WPW pattern  +  on ZIO sinus tachycardia  He has never had high risk symptoms like presyncope, syncope, reproducible palpitation    Patient did undergo a stress test, relevant EKGs attached below  Preexcitation noted at baseline heart rate 80 and even at heart rate 133 but disappears at heart rate 176/min  As of now the pathway does not conduct at rate higher than the AV node    There is no documentation of pathway mediated tachycardia  Patient has not had any significant palpitations  No ablation is recommended at this time    If clinical condition changes and patient has sustained tachycardia, further intervention is indicated -beta-blocker/ablation      Baseline  HR                            Chest pain  This is not suggestive of angina  BMI is 41 and this may be secondary to esophageal spasm/GERD  GI consult placed      Morbid obesity  BMI 41  Dietary changes recommended    History of Present Illness   HPI: Dedrick Cazares is a 19 y.o. year old male has been referred to me by Dr Solomon for the evaluation and management of WPW pattern      The patient has significant medical illnesses which include  WPW pattern  Chest pain  Morbid obesity  Suspected esophageal spasm/reflux    Patient is not complaining of anginal-like chest pain  He has occasionally had chest pain when he is cutting shrubs, bending over  It would be right in the middle of the chest with some pain also over the left side  Sometimes he can walk through it  Sometimes he will sit and it will gradually go away    He is a     He is not complaining of palpitation, presyncope or syncope  He is not complaining of orthostatic lightheadedness  He is not complaining of orthopnea, PND, leg swelling  He is not complaining of exertional intolerance      He does not abuse tobacco or alcohol, energy drinks, drugs      Historical Information   Past Medical History:   Diagnosis Date    Allergic rhinitis      Past Surgical History:   Procedure Laterality Date    CIRCUMCISION      HYDROCELE EXCISION / REPAIR  2011     Social History     Substance and Sexual Activity   Alcohol Use Not Currently     Social History     Substance and Sexual Activity   Drug Use Never     Social History     Tobacco Use   Smoking Status Never   Smokeless Tobacco Never     Social History     Socioeconomic History    Marital status: Single     Spouse name: Not on file    Number of children: Not on file    Years of education: Not on file    Highest education level: Not on file   Occupational History    Not on file   Tobacco Use    Smoking status: Never    Smokeless tobacco: Never   Vaping Use    Vaping status: Never Used   Substance and  "Sexual Activity    Alcohol use: Not Currently    Drug use: Never    Sexual activity: Never   Other Topics Concern    Not on file   Social History Narrative    Lives with mom,  dad, sister and  brother    Smoke/CO detectors in home     Pets- 4 cats and 1 dog    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  Family History:  Family History   Problem Relation Age of Onset    Asthma Mother     Allergy (severe) Mother     Psoriasis Mother     Obesity Mother         had gastric sleeve    No Known Problems Father     Allergy (severe) Sister     Allergy (severe) Brother     Heart disease Maternal Grandmother     Hypertension Maternal Grandmother     Asthma Maternal Grandfather     Cancer Maternal Grandfather     COPD Maternal Grandfather     Hypertension Paternal Grandmother     Hypertension Paternal Grandfather     Heart disease Paternal Uncle     Hypertension Paternal Uncle     Hyperlipidemia Paternal Uncle          Meds/Allergies      No current facility-administered medications for this visit.        Not in a hospital admission.    Allergies   Allergen Reactions    Amoxicillin Rash           Objective   Vitals: Visit Vitals  /74 (BP Location: Left arm, Patient Position: Sitting, Cuff Size: Standard)   Pulse 70   Ht 5' 5\" (1.651 m)   Wt 113 kg (249 lb)   BMI 41.44 kg/m²   Smoking Status Never   BSA 2.17 m²      Vitals:    06/17/24 1038   Weight: 113 kg (249 lb)   [unfilled]    Invasive Devices       None                     ROS  Review of Systems   All other systems reviewed and are negative.  As described in my history of present illness        PHYSICAL EXAM  Physical Exam  Vitals " reviewed.   Constitutional:       General: He is not in acute distress.     Appearance: Normal appearance. He is obese. He is not ill-appearing.      Comments: BMI is 41   HENT:      Head: Normocephalic and atraumatic.      Right Ear: External ear normal.      Left Ear: External ear normal.      Nose: Nose normal.      Mouth/Throat:      Comments: Posterior pharynx is crowded  Eyes:      General: No scleral icterus.     Extraocular Movements: Extraocular movements intact.      Conjunctiva/sclera: Conjunctivae normal.      Pupils: Pupils are equal, round, and reactive to light.   Neck:      Comments: Large thick neck  Cardiovascular:      Rate and Rhythm: Normal rate and regular rhythm.      Heart sounds: Normal heart sounds. No murmur heard.     No gallop.   Pulmonary:      Effort: No respiratory distress.      Breath sounds: Normal breath sounds. No wheezing.   Abdominal:      General: Bowel sounds are normal. There is no distension.      Palpations: Abdomen is soft.      Tenderness: There is no abdominal tenderness. There is no guarding.      Comments: Central obesity present   Musculoskeletal:         General: No swelling or deformity.      Cervical back: Neck supple. No rigidity.   Skin:     Coloration: Skin is not jaundiced.      Findings: No bruising or lesion.   Neurological:      Mental Status: He is alert and oriented to person, place, and time. Mental status is at baseline.      Motor: No weakness.   Psychiatric:         Mood and Affect: Mood normal.         Behavior: Behavior normal.         Thought Content: Thought content normal.         Judgment: Judgment normal.               LAB RESULTS:    CBC:  WBC   Date Value Ref Range Status   03/07/2024 7.99 4.31 - 10.16 Thousand/uL Final     Hemoglobin   Date Value Ref Range Status   03/07/2024 15.8 12.0 - 17.0 g/dL Final     Hematocrit   Date Value Ref Range Status   03/07/2024 45.0 36.5 - 49.3 % Final     MCV   Date Value Ref Range Status   03/07/2024 83 82  - 98 fL Final     Platelets   Date Value Ref Range Status   03/07/2024 309 149 - 390 Thousands/uL Final     RBC   Date Value Ref Range Status   03/07/2024 5.41 3.88 - 5.62 Million/uL Final     MCH   Date Value Ref Range Status   03/07/2024 29.2 26.8 - 34.3 pg Final     MCHC   Date Value Ref Range Status   03/07/2024 35.1 31.4 - 37.4 g/dL Final     RDW   Date Value Ref Range Status   03/07/2024 12.7 11.6 - 15.1 % Final     MPV   Date Value Ref Range Status   03/07/2024 9.9 8.9 - 12.7 fL Final     nRBC   Date Value Ref Range Status   03/07/2024 0 /100 WBCs Final       CMP:  Potassium   Date Value Ref Range Status   03/07/2024 3.9 3.5 - 5.3 mmol/L Final   02/17/2022 3.8 3.5 - 5.1 mmol/L Final     Chloride   Date Value Ref Range Status   03/07/2024 107 96 - 108 mmol/L Final   02/17/2022 105 100 - 108 mmol/L Final     Carbon Dioxide   Date Value Ref Range Status   02/17/2022 24 21 - 31 mmol/L Final     CO2   Date Value Ref Range Status   03/07/2024 23 21 - 32 mmol/L Final     BUN   Date Value Ref Range Status   03/07/2024 11 5 - 25 mg/dL Final   02/17/2022 11 7 - 25 mg/dL Final     Creatinine   Date Value Ref Range Status   03/07/2024 0.83 0.60 - 1.30 mg/dL Final     Comment:     Standardized to IDMS reference method   02/17/2022 0.92 0.70 - 1.30 mg/dL Final     Calcium   Date Value Ref Range Status   03/07/2024 9.8 8.4 - 10.2 mg/dL Final   02/17/2022 9.4 8.6 - 10.2 mg/dL Final     AST   Date Value Ref Range Status   03/07/2024 23 13 - 39 U/L Final   02/17/2022 19 13 - 39 U/L Final     ALT   Date Value Ref Range Status   03/07/2024 31 7 - 52 U/L Final     Comment:     Specimen collection should occur prior to Sulfasalazine administration due to the potential for falsely depressed results.    02/17/2022 29 7 - 52 U/L Final     Alkaline Phosphatase   Date Value Ref Range Status   03/07/2024 73 34 - 104 U/L Final   02/17/2022 73 34 - 104 U/L Final     eGFR   Date Value Ref Range Status   03/07/2024 125 ml/min/1.73sq m Final  "       Magnesium:   No results found for: \"MG\", \"MAGNESIUM\"     A1C:  Hemoglobin A1C   Date Value Ref Range Status   2019 4.8 4.2 - 6.3 % Final        TSH:  TSH 3RD GENERATON   Date Value Ref Range Status   2019 2.530 0.463 - 3.980 uIU/mL Final     Comment:       Using supplements with high doses of biotin 20 to more than 300 times greater than the adequate daily intake for adults of 30 mcg/day as established by the [place] [employer], can cause falsely depress results.        PT/INR:  No results found for: \"PROTIME\", \"INR\"    Lipid Panel:  No results found for: \"CHOLESTEROL\", \"TRIG\", \"HDL\", \"LDLCAL\", \"NONHDLC\"    Troponin:  No results found for: \"TROPONINI\"      Imaging:    [id number]        BRETT:  No results found for this or any previous visit.      Echocardiogram:  Results for orders placed during the hospital encounter of 24    Echo complete w/ contrast if indicated    Interpretation Summary    Left Ventricle: Left ventricular cavity size is normal. Wall thickness is normal. The left ventricular ejection fraction is 60%. Systolic function is normal. Wall motion is normal. Diastolic function is normal.      Stress Test:   Results for orders placed during the hospital encounter of 24    Stress test only, exercise    Interpretation Summary  1.  Somewhat limited exercise tolerance due to shortness of breath  2.  Resting delta wave most prominent in lead I which persisted throughout the study.  3.  Flat J-point depression in lead I only at maximum exercise-questionable significance given accessory conduction pathway most prominent in lead I  4.  No significant rhythm abnormality.  5.  No chest discomfort      Cardiac Catheterization:  No results found for this or any previous visit.      HOLTER MONITOR: 24 HOUR/48 HOUR MONITORS  No results found for this or any previous visit.      AMB Extended Holter Monitor: Zio XT/AT or Vontu      AMB event recorder    Vontu  2024 - " 04/28/2024   Juanito Sheridan MD  4/30/2024  4:47 PM EDT       [facility]     Event Recorder Results     Indication: WPW     Duration of monitoring: 3d 5h 43m     Results:  Predominant underlying rhythm: Normal sinus rhythm  Average heart rate: 89 bpm  Ventricular preexcitation was predominantly noted.  Episodes of tachycardia were noted.  Most appeared consistent with sinus tachycardia however SVT cannot be definitively excluded.  Onset and termination were not noted.                     DEVICE CHECK:     No results found for this or any previous visit.           Code Status: [unfilled]  Advance Directive and Living Will:      Power of :    POLST:      Counseling / Coordination of Care  Detailed discussion done with the patient with regards to WPW pattern      Georgi Durand MD

## 2024-06-17 ENCOUNTER — CONSULT (OUTPATIENT)
Dept: CARDIOLOGY CLINIC | Facility: CLINIC | Age: 19
End: 2024-06-17
Payer: COMMERCIAL

## 2024-06-17 VITALS
HEART RATE: 70 BPM | HEIGHT: 65 IN | DIASTOLIC BLOOD PRESSURE: 74 MMHG | BODY MASS INDEX: 41.48 KG/M2 | SYSTOLIC BLOOD PRESSURE: 122 MMHG | WEIGHT: 249 LBS

## 2024-06-17 DIAGNOSIS — I45.6 WOLFF-PARKINSON-WHITE (WPW) PATTERN: Primary | ICD-10-CM

## 2024-06-17 DIAGNOSIS — R07.9 CHEST PAIN, UNSPECIFIED TYPE: ICD-10-CM

## 2024-06-17 DIAGNOSIS — K21.9 GERD WITHOUT ESOPHAGITIS: ICD-10-CM

## 2024-06-17 PROCEDURE — 99204 OFFICE O/P NEW MOD 45 MIN: CPT | Performed by: INTERNAL MEDICINE

## 2024-06-17 PROCEDURE — 93000 ELECTROCARDIOGRAM COMPLETE: CPT | Performed by: INTERNAL MEDICINE

## 2024-06-17 NOTE — LETTER
June 17, 2024     Patient: Dedrick Cazares  YOB: 2005  Date of Visit: 6/17/2024      To Whom it May Concern:    Dedrick Cazares is under my professional care. Dedrick was seen in my office on 6/17/2024. Dedrick may return to work on 06/18/2024 .    If you have any questions or concerns, please don't hesitate to call.         Sincerely,          Georgi Durand MD        CC: No Recipients

## 2024-06-17 NOTE — PATIENT INSTRUCTIONS
- Schedule appointment with [facility] in [place] for evaluation of GERD w/ chest pain   - Call [phone] for appointment

## 2024-06-17 NOTE — LETTER
June 17, 2024     Patient: Dedrick Cazares   YOB: 2005   Date of Visit: 6/17/2024       To Whom It May Concern:    It is my medical opinion that Dedrick Cazares may return to full duty immediately with no restrictions.    If you have any questions or concerns, please don't hesitate to call.         Sincerely,        Georgi Durand MD    CC: No Recipients

## 2024-06-17 NOTE — LETTER
June 17, 2024     Juanito Sheridan MD  [address]  [address]  [facility] [address]    Patient: Dedrick Cazares   YOB: 2005   Date of Visit: 6/17/2024       Dear Dr. Sheridan:    Thank you for referring Dedrick Cazares to me for evaluation. Below are my notes for this consultation.    If you have questions, please do not hesitate to call me. I look forward to following your patient along with you.         Sincerely,        Georgi Durand MD        CC: Dedrick Cazares  MD Georgi Moe MD  6/17/2024 12:13 PM  Sign when Signing Visit   Consultation - Electrophysiology-Cardiology (EP)   Dedrick Cazares 19 y.o. male MRN: [id number]  Unit/Bed#:  Encounter: [id number]      1. Rubio-Parkinson-White (WPW) pattern  POCT ECG      2. GERD without esophagitis  Ambulatory referral to Gastroenterology      3. Chest pain, unspecified type  Ambulatory referral to Gastroenterology      4. BMI (body mass index), pediatric, greater than 99% for age              Consults  Physician Requesting Consult: Juanito Sheridan MD   Reason for Consult / Principal Problem: WPW pattern       Summary of my recommendation for the patient  WPW pattern-no tachycardia documented-on stress testing no antegrade conduction over pathway at higher heart rates -no intervention planned at this time  If situation changes and he has significant tachycardia, will consider medication/ablation    Chest pain-happens occasionally when he is bending over and cutting shrubs -can work through it  many times, stress test is negative for ischemia -referral to GI for evaluation of GERD    Obesity-BMI 41-needs dietary changes, follow-up with GI          Medical conditions  WPW pattern  Chest pain  Obesity  Suspected GERD/esophageal spasm          Assessment & Plan    WPW pattern  Patient does give a history of incidentally identified WPW pattern  +  on ZIO sinus tachycardia  He has never had high risk symptoms like presyncope,  syncope, reproducible palpitation    Patient did undergo a stress test, relevant EKGs attached below  Preexcitation noted at baseline heart rate 80 and even at heart rate 133 but disappears at heart rate 176/min  As of now the pathway does not conduct at rate higher than the AV node    There is no documentation of pathway mediated tachycardia  Patient has not had any significant palpitations  No ablation is recommended at this time    If clinical condition changes and patient has sustained tachycardia, further intervention is indicated -beta-blocker/ablation      Baseline HR                            Chest pain  This is not suggestive of angina  BMI is 41 and this may be secondary to esophageal spasm/GERD  GI consult placed      Morbid obesity  BMI 41  Dietary changes recommended    History of Present Illness  HPI: Dedrick Cazares is a 19 y.o. year old male has been referred to me by Dr Solomon for the evaluation and management of WPW pattern      The patient has significant medical illnesses which include  WPW pattern  Chest pain  Morbid obesity  Suspected esophageal spasm/reflux    Patient is not complaining of anginal-like chest pain  He has occasionally had chest pain when he is cutting shrubs, bending over  It would be right in the middle of the chest with some pain also over the left side  Sometimes he can walk through it  Sometimes he will sit and it will gradually go away    He is a     He is not complaining of palpitation, presyncope or syncope  He is not complaining of orthostatic lightheadedness  He is not complaining of orthopnea, PND, leg swelling  He is not complaining of exertional intolerance      He does not abuse tobacco or alcohol, energy drinks, drugs      Historical Information  Past Medical History:   Diagnosis Date   • Allergic rhinitis      Past Surgical History:   Procedure Laterality Date   • CIRCUMCISION     • HYDROCELE EXCISION / REPAIR  2011     Social History      Substance and Sexual Activity   Alcohol Use Not Currently     Social History     Substance and Sexual Activity   Drug Use Never     Social History     Tobacco Use   Smoking Status Never   Smokeless Tobacco Never     Social History     Socioeconomic History   • Marital status: Single     Spouse name: Not on file   • Number of children: Not on file   • Years of education: Not on file   • Highest education level: Not on file   Occupational History   • Not on file   Tobacco Use   • Smoking status: Never   • Smokeless tobacco: Never   Vaping Use   • Vaping status: Never Used   Substance and Sexual Activity   • Alcohol use: Not Currently   • Drug use: Never   • Sexual activity: Never   Other Topics Concern   • Not on file   Social History Narrative    Lives with mom,  dad, sister and  brother    Smoke/CO detectors in home     Pets- 4 cats and 1 dog    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  Family History:  Family History   Problem Relation Age of Onset   • Asthma Mother    • Allergy (severe) Mother    • Psoriasis Mother    • Obesity Mother         had gastric sleeve   • No Known Problems Father    • Allergy (severe) Sister    • Allergy (severe) Brother    • Heart disease Maternal Grandmother    • Hypertension Maternal Grandmother    • Asthma Maternal Grandfather    • Cancer Maternal Grandfather    • COPD Maternal Grandfather    • Hypertension Paternal Grandmother    • Hypertension Paternal Grandfather    • Heart disease Paternal Uncle    • Hypertension Paternal Uncle    • Hyperlipidemia Paternal Uncle          Meds/Allergies     No current facility-administered medications for this  "visit.        Not in a hospital admission.    Allergies   Allergen Reactions   • Amoxicillin Rash           Objective  Vitals: Visit Vitals  /74 (BP Location: Left arm, Patient Position: Sitting, Cuff Size: Standard)   Pulse 70   Ht 5' 5\" (1.651 m)   Wt 113 kg (249 lb)   BMI 41.44 kg/m²   Smoking Status Never   BSA 2.17 m²      Vitals:    06/17/24 1038   Weight: 113 kg (249 lb)   [unfilled]    Invasive Devices       None                     ROS  Review of Systems   All other systems reviewed and are negative.  As described in my history of present illness        PHYSICAL EXAM  Physical Exam  Vitals reviewed.   Constitutional:       General: He is not in acute distress.     Appearance: Normal appearance. He is obese. He is not ill-appearing.      Comments: BMI is 41   HENT:      Head: Normocephalic and atraumatic.      Right Ear: External ear normal.      Left Ear: External ear normal.      Nose: Nose normal.      Mouth/Throat:      Comments: Posterior pharynx is crowded  Eyes:      General: No scleral icterus.     Extraocular Movements: Extraocular movements intact.      Conjunctiva/sclera: Conjunctivae normal.      Pupils: Pupils are equal, round, and reactive to light.   Neck:      Comments: Large thick neck  Cardiovascular:      Rate and Rhythm: Normal rate and regular rhythm.      Heart sounds: Normal heart sounds. No murmur heard.     No gallop.   Pulmonary:      Effort: No respiratory distress.      Breath sounds: Normal breath sounds. No wheezing.   Abdominal:      General: Bowel sounds are normal. There is no distension.      Palpations: Abdomen is soft.      Tenderness: There is no abdominal tenderness. There is no guarding.      Comments: Central obesity present   Musculoskeletal:         General: No swelling or deformity.      Cervical back: Neck supple. No rigidity.   Skin:     Coloration: Skin is not jaundiced.      Findings: No bruising or lesion.   Neurological:      Mental Status: He is alert " and oriented to person, place, and time. Mental status is at baseline.      Motor: No weakness.   Psychiatric:         Mood and Affect: Mood normal.         Behavior: Behavior normal.         Thought Content: Thought content normal.         Judgment: Judgment normal.               LAB RESULTS:    CBC:  WBC   Date Value Ref Range Status   03/07/2024 7.99 4.31 - 10.16 Thousand/uL Final     Hemoglobin   Date Value Ref Range Status   03/07/2024 15.8 12.0 - 17.0 g/dL Final     Hematocrit   Date Value Ref Range Status   03/07/2024 45.0 36.5 - 49.3 % Final     MCV   Date Value Ref Range Status   03/07/2024 83 82 - 98 fL Final     Platelets   Date Value Ref Range Status   03/07/2024 309 149 - 390 Thousands/uL Final     RBC   Date Value Ref Range Status   03/07/2024 5.41 3.88 - 5.62 Million/uL Final     MCH   Date Value Ref Range Status   03/07/2024 29.2 26.8 - 34.3 pg Final     MCHC   Date Value Ref Range Status   03/07/2024 35.1 31.4 - 37.4 g/dL Final     RDW   Date Value Ref Range Status   03/07/2024 12.7 11.6 - 15.1 % Final     MPV   Date Value Ref Range Status   03/07/2024 9.9 8.9 - 12.7 fL Final     nRBC   Date Value Ref Range Status   03/07/2024 0 /100 WBCs Final       CMP:  Potassium   Date Value Ref Range Status   03/07/2024 3.9 3.5 - 5.3 mmol/L Final   02/17/2022 3.8 3.5 - 5.1 mmol/L Final     Chloride   Date Value Ref Range Status   03/07/2024 107 96 - 108 mmol/L Final   02/17/2022 105 100 - 108 mmol/L Final     Carbon Dioxide   Date Value Ref Range Status   02/17/2022 24 21 - 31 mmol/L Final     CO2   Date Value Ref Range Status   03/07/2024 23 21 - 32 mmol/L Final     BUN   Date Value Ref Range Status   03/07/2024 11 5 - 25 mg/dL Final   02/17/2022 11 7 - 25 mg/dL Final     Creatinine   Date Value Ref Range Status   03/07/2024 0.83 0.60 - 1.30 mg/dL Final     Comment:     Standardized to IDMS reference method   02/17/2022 0.92 0.70 - 1.30 mg/dL Final     Calcium   Date Value Ref Range Status   03/07/2024 9.8 8.4  "- 10.2 mg/dL Final   2022 9.4 8.6 - 10.2 mg/dL Final     AST   Date Value Ref Range Status   2024 23 13 - 39 U/L Final   2022 19 13 - 39 U/L Final     ALT   Date Value Ref Range Status   2024 31 7 - 52 U/L Final     Comment:     Specimen collection should occur prior to Sulfasalazine administration due to the potential for falsely depressed results.    2022 29 7 - 52 U/L Final     Alkaline Phosphatase   Date Value Ref Range Status   2024 73 34 - 104 U/L Final   2022 73 34 - 104 U/L Final     eGFR   Date Value Ref Range Status   2024 125 ml/min/1.73sq m Final        Magnesium:   No results found for: \"MG\", \"MAGNESIUM\"     A1C:  Hemoglobin A1C   Date Value Ref Range Status   2019 4.8 4.2 - 6.3 % Final        TSH:  TSH 3RD GENERATON   Date Value Ref Range Status   2019 2.530 0.463 - 3.980 uIU/mL Final     Comment:       Using supplements with high doses of biotin 20 to more than 300 times greater than the adequate daily intake for adults of 30 mcg/day as established by the [employer], can cause falsely depress results.        PT/INR:  No results found for: \"PROTIME\", \"INR\"    Lipid Panel:  No results found for: \"CHOLESTEROL\", \"TRIG\", \"HDL\", \"LDLCAL\", \"NONHDLC\"    Troponin:  No results found for: \"TROPONINI\"      Imaging:    [id number]        BRETT:  No results found for this or any previous visit.      Echocardiogram:  Results for orders placed during the hospital encounter of 24    Echo complete w/ contrast if indicated    Interpretation Summary  •  Left Ventricle: Left ventricular cavity size is normal. Wall thickness is normal. The left ventricular ejection fraction is 60%. Systolic function is normal. Wall motion is normal. Diastolic function is normal.      Stress Test:   Results for orders placed during the hospital encounter of 24    Stress test only, exercise    Interpretation Summary  1.  Somewhat limited exercise " tolerance due to shortness of breath  2.  Resting delta wave most prominent in lead I which persisted throughout the study.  3.  Flat J-point depression in lead I only at maximum exercise-questionable significance given accessory conduction pathway most prominent in lead I  4.  No significant rhythm abnormality.  5.  No chest discomfort      Cardiac Catheterization:  No results found for this or any previous visit.      HOLTER MONITOR: 24 HOUR/48 HOUR MONITORS  No results found for this or any previous visit.      AMB Extended Holter Monitor: Zio XT/AT or BioTel      AMB event recorder    BioTel  04/22/2024 - 04/28/2024   Juanito Sheridan MD  4/30/2024  4:47 PM EDT       [facility]     Event Recorder Results     Indication: WPW     Duration of monitoring: 3d 5h 43m     Results:  Predominant underlying rhythm: Normal sinus rhythm  Average heart rate: 89 bpm  Ventricular preexcitation was predominantly noted.  Episodes of tachycardia were noted.  Most appeared consistent with sinus tachycardia however SVT cannot be definitively excluded.  Onset and termination were not noted.                     DEVICE CHECK:     No results found for this or any previous visit.           Code Status: [unfilled]  Advance Directive and Living Will:      Power of :    POLST:      Counseling / Coordination of Care  Detailed discussion done with the patient with regards to WPW pattern      Georgi Durand MD

## 2024-07-11 ENCOUNTER — OFFICE VISIT (OUTPATIENT)
Age: 19
End: 2024-07-11
Payer: COMMERCIAL

## 2024-07-11 VITALS
HEIGHT: 65 IN | HEART RATE: 78 BPM | DIASTOLIC BLOOD PRESSURE: 100 MMHG | OXYGEN SATURATION: 97 % | WEIGHT: 249 LBS | BODY MASS INDEX: 41.48 KG/M2 | SYSTOLIC BLOOD PRESSURE: 162 MMHG

## 2024-07-11 DIAGNOSIS — K21.9 GERD WITHOUT ESOPHAGITIS: ICD-10-CM

## 2024-07-11 DIAGNOSIS — R07.9 CHEST PAIN, UNSPECIFIED TYPE: Primary | ICD-10-CM

## 2024-07-11 DIAGNOSIS — E66.01 CLASS 3 SEVERE OBESITY WITH BODY MASS INDEX (BMI) OF 40.0 TO 44.9 IN ADULT, UNSPECIFIED OBESITY TYPE, UNSPECIFIED WHETHER SERIOUS COMORBIDITY PRESENT (HCC): ICD-10-CM

## 2024-07-11 PROCEDURE — 99204 OFFICE O/P NEW MOD 45 MIN: CPT | Performed by: INTERNAL MEDICINE

## 2024-07-11 RX ORDER — OMEPRAZOLE 40 MG/1
40 CAPSULE, DELAYED RELEASE ORAL DAILY
Qty: 90 CAPSULE | Refills: 3 | Status: SHIPPED | OUTPATIENT
Start: 2024-07-11

## 2024-07-11 NOTE — PROGRESS NOTES
[facility] - Outpatient Note  Dedrick Cazares 19 y.o. male MRN: [id number]  Encounter: [id number]      ASSESSMENT AND PLAN:    Dedrick Cazares is a 19 y.o. old pleasant male with PMH of Rubio-Parkinson-White, allergic rhinitis, acne vulgaris, obesity who presents for consultation for chest pain    Chest pain-overall I doubt his pain is gastrointestinal in nature.  It appears that since starting Prilosec his heartburn has resolved however he still continues to have this intermittent chest pain that is not related to any food which would point away from gastrointestinal etiology.  There could certainly be a musculoskeletal component to his chest pain especially given his physically demanding job.  He also reports significant rib pain with different movements.  Start Voltaren gel to the chest  Increase omeprazole to 40 mg daily and advised on how to properly take PPI.  I offered patient EGD however he is not interested  I offered patient 24-hour pH/manometry after EGD to evaluate for GERD and esophageal spasm to complete the workup however he is not interested in invasive testing    GERD-previously occurring multiple times per week however now with Prilosec 20 mg daily his heartburn has been essentially eliminated.  He is currently take his Prilosec before bedtime.  PPI as above  GERD lifestyle changes discussed, including avoidance of trigger foods (potential foods include coffee, caffeine, chocolate, mint, tomato-based products, spicy foods, fatty foods), avoid tight fitting clothing, elevated head of bed 30 degrees, avoid eating 2-3 hours prior to bedtime, weight loss, avoid alcohol, avoid tobacco use.    Obesity-BMI 41.44.  He has been working on losing weight.  Counseled on diet, exercise and continued weight loss        1. GERD without esophagitis    - Ambulatory referral to Gastroenterology    2. Chest pain, unspecified type    - Ambulatory referral to  Gastroenterology    ______________________________________________________________________    SUBJECTIVE:      Chest pain radiates to right pec, started 4 months ago, sharp pressure pain, can happen multiple times a week, lasts 5 minutes, not related to food movement ebnd,  nothing makes it better or worse    Been on prilosec 20 mg 3 months qhs with no imprvement in chest pain but has helped heartburn    No dysphagia/odynophagia. No blood in stool. No significant NSAID      when bends over too much his ribs hurt    I reviewed prior external notes    I reviewed previous lab results and images      REVIEW OF SYSTEMS:     REVIEW OF ALL OTHER SYSTEMS IS OTHERWISE NEGATIVE.      Historical Information   Past Medical History:   Diagnosis Date    Allergic rhinitis      Past Surgical History:   Procedure Laterality Date    CIRCUMCISION      HYDROCELE EXCISION / REPAIR  2011     Social History   Social History     Substance and Sexual Activity   Alcohol Use Not Currently     Social History     Substance and Sexual Activity   Drug Use Never     Social History     Tobacco Use   Smoking Status Never   Smokeless Tobacco Never     Family History   Problem Relation Age of Onset    Asthma Mother     Allergy (severe) Mother     Psoriasis Mother     Obesity Mother         had gastric sleeve    No Known Problems Father     Allergy (severe) Sister     Allergy (severe) Brother     Heart disease Maternal Grandmother     Hypertension Maternal Grandmother     Asthma Maternal Grandfather     Cancer Maternal Grandfather     COPD Maternal Grandfather     Hypertension Paternal Grandmother     Hypertension Paternal Grandfather     Heart disease Paternal Uncle     Hypertension Paternal Uncle     Hyperlipidemia Paternal Uncle        Meds/Allergies       Current Outpatient Medications:     fluticasone (FLONASE) 50 mcg/act nasal spray    Allergies   Allergen Reactions    Amoxicillin Rash           Objective     Blood pressure 162/100, pulse 78,  "height 5' 5\" (1.651 m), weight 113 kg (249 lb), SpO2 97%. Body mass index is 41.44 kg/m².      PHYSICAL EXAM:      General Appearance:   Alert, cooperative, no distress   HEENT:   Normocephalic, atraumatic, anicteric.     Neck:  Supple, symmetrical, trachea midline   Lungs:   Clear to auscultation bilaterally; no rales, rhonchi or wheezing; respirations unlabored    Heart::   Regular rate and rhythm; no murmur, rub, or gallop.   Abdomen:   Soft, non-tender, non-distended; normal bowel sounds; no masses, no organomegaly    Genitalia:   Deferred    Rectal:   Deferred    Extremities:  No cyanosis, clubbing or edema    Pulses:  2+ and symmetric    Skin:  No jaundice, rashes, or lesions    Lymph nodes:  No palpable cervical lymphadenopathy        Lab Results:   No visits with results within 1 Day(s) from this visit.   Latest known visit with results is:   Hospital Outpatient Visit on 05/08/2024   Component Date Value    Baseline HR 05/08/2024 81     Baseline BP 05/08/2024 158/102     O2 sat rest 05/08/2024 98     Stress peak HR 05/08/2024 179     Post peak BP 05/08/2024 176     O2 sat peak 05/08/2024 98     Recovery HR 05/08/2024 94     Recovery BP 05/08/2024 140/102     O2 sat recovery 05/08/2024 97     Max HR 05/08/2024 179     Max HR Percent 05/08/2024 89     Exercise duration (min) 05/08/2024 6     Estimated workload 05/08/2024 7.0     Rate Pressure Product 05/08/2024 31,504.0     Angina Index 05/08/2024 0     Stress Stage Reached 05/08/2024 2.0     Protocol Name 05/08/2024 LYLY     Exercise duration (min) 05/08/2024 6     Exercise duration (sec) 05/08/2024 0     Post Peak Systolic BP 05/08/2024 176     Max Diastolic Bp 05/08/2024 106     Peak HR 05/08/2024 179     Max Predicted Heart Rate 05/08/2024 199     Reason for Termination 05/08/2024                      Value:Target Heart Rate Achieved  Maximal exercise threshold  Dyspnea/ SOB      Test Indication 05/08/2024 WPW     Target Hr Formular 05/08/2024 (220 - " Age)*85%     Chest Pain Statement 05/08/2024 none     Protocol Name 05/08/2024 LYLY     Exercise duration (min) 05/08/2024 6     Exercise duration (sec) 05/08/2024 0     Post Peak Systolic BP 05/08/2024 176     Max Diastolic Bp 05/08/2024 106     Peak HR 05/08/2024 179     Max Predicted Heart Rate 05/08/2024 199     Reason for Termination 05/08/2024                      Value:Target Heart Rate Achieved  Maximal exercise threshold  Dyspnea/ SOB      Test Indication 05/08/2024 WPW     Target Hr Formular 05/08/2024 (220 - Age)*85%     Chest Pain Statement 05/08/2024 none          Radiology Results:   No results found.

## 2024-08-14 ENCOUNTER — OFFICE VISIT (OUTPATIENT)
Age: 19
End: 2024-08-14
Payer: COMMERCIAL

## 2024-08-14 VITALS
DIASTOLIC BLOOD PRESSURE: 82 MMHG | RESPIRATION RATE: 16 BRPM | TEMPERATURE: 96.8 F | WEIGHT: 252.2 LBS | SYSTOLIC BLOOD PRESSURE: 137 MMHG | OXYGEN SATURATION: 95 % | HEART RATE: 73 BPM | BODY MASS INDEX: 41.97 KG/M2

## 2024-08-14 DIAGNOSIS — K29.70 GASTRITIS WITHOUT BLEEDING, UNSPECIFIED CHRONICITY, UNSPECIFIED GASTRITIS TYPE: Primary | ICD-10-CM

## 2024-08-14 PROCEDURE — 99213 OFFICE O/P EST LOW 20 MIN: CPT | Performed by: PHYSICIAN ASSISTANT

## 2024-08-14 RX ORDER — FAMOTIDINE 20 MG/1
20 TABLET, FILM COATED ORAL 2 TIMES DAILY
Qty: 42 TABLET | Refills: 0 | Status: SHIPPED | OUTPATIENT
Start: 2024-08-14 | End: 2024-09-04

## 2024-08-14 NOTE — PATIENT INSTRUCTIONS
Patient Education     Gastritis Discharge Instructions   About this topic   Gastritis is inflammation of the lining of the stomach. Sometimes gastritis is caused by bacteria. Other times, it can be caused by drugs. Some types of drugs can cause gastritis. The most common are nonsteroidal anti-inflammatory drugs (NSAIDs) like ibuprofen or naproxen. Gastritis can also be caused by other things like drinking alcohol or having a serious illness. It can also happen if you have a health problem in which the body’s own infection fighting system attacks the stomach lining. Based on the cause, you may need to take an antibiotic or other medicine to treat your gastritis. If so, be sure you finish all of the medicine that is ordered.     What care is needed at home?   Ask your doctor what you need to do when you go home. Make sure you ask questions if you do not understand what the doctor says.  Eat small meals more often to help with belly pain.  Keep a diary about your pain and the foods you eat. Then you can avoid those that bother your stomach.  Avoid or limit spicy foods.  Avoid or limit beer, wine, and mixed drinks.  If you smoke, try to quit. Your doctor or nurse can help.  Try to learn ways to manage stress. Stress may cause the acid levels in your stomach to rise.  If possible, avoid long-term use of aspirin and other anti-inflammatory drugs.  What follow-up care is needed?   Your doctor may ask you to make visits to the office to check on your progress. Be sure to keep these visits.  What drugs may be needed?   The doctor may order drugs to:  Fight an infection  Control how much acid your stomach makes  Help healing  Will physical activity be limited?   You may want to limit your activity if you have belly pain. Having an upset stomach or throwing up may also limit what you do. You may need more rest if you feel weak or tired.  What problems could happen?   Stomach ulcer or bleeding  Stomach cancer  When do I need to  call the doctor?   You start throwing up blood or pass a lot of blood in your stool.  Your belly pain becomes much worse all of a sudden or over a few hours.  Your belly becomes hard or tender.  You have chest pain or trouble breathing  Your stools are bright red, black, or tar-colored.  You are throwing up often.  Your belly pain does not get better even after taking medicine, changing your diet, and following treatment instructions.  You lose a lot of weight without trying.  Teach Back: Helping You Understand   The Teach Back Method helps you understand the information we are giving you. After you talk with the staff, tell them in your own words what you learned. This helps to make sure the staff has described each thing clearly. It also helps to explain things that may have been confusing. Before going home, make sure you can do these:  I can tell you about my condition.  I can tell you if I need to make changes with my diet or drugs.  I can tell you what I will do if I throw up blood or have bloody or black tarry stools.  Last Reviewed Date   2021-06-08  Consumer Information Use and Disclaimer   This generalized information is a limited summary of diagnosis, treatment, and/or medication information. It is not meant to be comprehensive and should be used as a tool to help the user understand and/or assess potential diagnostic and treatment options. It does NOT include all information about conditions, treatments, medications, side effects, or risks that may apply to a specific patient. It is not intended to be medical advice or a substitute for the medical advice, diagnosis, or treatment of a health care provider based on the health care provider's examination and assessment of a patient’s specific and unique circumstances. Patients must speak with a health care provider for complete information about their health, medical questions, and treatment options, including any risks or benefits regarding use of  medications. This information does not endorse any treatments or medications as safe, effective, or approved for treating a specific patient. [employer] and its affiliates disclaim any warranty or liability relating to this information or the use thereof. The use of this information is governed by the Terms of Use, available at https://www.[employer].com/en/know/clinical-effectiveness-terms   Copyright   Copyright © 2024 [employer], [employer] and its affiliates and/or licensors. All rights reserved.

## 2024-08-14 NOTE — PROGRESS NOTES
[facility]        NAME: Dedrick Cazares is a 19 y.o. male  : 2005    MRN: [id number]  DATE: 2024  TIME: 4:03 PM    Assessment and Plan   Gastritis without bleeding, unspecified chronicity, unspecified gastritis type [K29.70]  1. Gastritis without bleeding, unspecified chronicity, unspecified gastritis type  famotidine (PEPCID) 20 mg tablet    bismuth subsalicylate (PEPTO BISMOL) 524 mg/30 mL oral suspension            Patient Instructions       Follow up with PCP in 3-5 days.  Proceed to  ER if symptoms worsen.    If tests are performed, our office will contact you with results only if changes need to made to the care plan discussed with you at the visit. [facility]    Chief Complaint     Chief Complaint   Patient presents with    Vomiting     Symptoms started 2 days ago C/o stomach ache and sore throat.  Patient stated after he eats his stomach hurts,          History of Present Illness       History of acid reflux currently on PPIs which patient admits to not taking    Abdominal Pain  This is a new problem. The current episode started in the past 7 days. The problem occurs intermittently. The problem has been gradually worsening since onset. The pain is located in the epigastric region. The pain is mild. The quality of the pain is described as aching. The pain radiates to the epigastric region. Associated symptoms include anorexia. Pertinent negatives include no anxiety, diarrhea, fever, frequency, headaches, hematochezia, hematuria, melena, nausea, rash or vomiting (one day only). Relieved by: Not eating. He consumes acidic food. Past treatments include proton pump inhibitors. The treatment provided no improvement relief. Significant past medical history includes GERD. There is no past medical history of abdominal surgery or a UTI.       Review of Systems   Review of Systems   Constitutional:  Negative for chills and fever.   HENT:  Negative  for congestion.    Respiratory:  Negative for cough.    Gastrointestinal:  Positive for abdominal pain and anorexia. Negative for diarrhea, hematochezia, melena, nausea and vomiting (one day only).   Genitourinary:  Negative for frequency and hematuria.   Skin:  Negative for rash.   Neurological:  Negative for headaches.   Psychiatric/Behavioral:  The patient is not nervous/anxious.          Current Medications       Current Outpatient Medications:     bismuth subsalicylate (PEPTO BISMOL) 524 mg/30 mL oral suspension, Take 30 mL (524 mg total) by mouth every 6 (six) hours as needed for indigestion, Disp: 360 mL, Rfl: 0    famotidine (PEPCID) 20 mg tablet, Take 1 tablet (20 mg total) by mouth 2 (two) times a day for 21 days, Disp: 42 tablet, Rfl: 0    omeprazole (PriLOSEC) 40 MG capsule, Take 1 capsule (40 mg total) by mouth daily, Disp: 90 capsule, Rfl: 3    fluticasone (FLONASE) 50 mcg/act nasal spray, 1 spray into each nostril daily (Patient not taking: Reported on 6/17/2024), Disp: 16 g, Rfl: 6    Current Allergies     Allergies as of 08/14/2024 - Reviewed 08/14/2024   Allergen Reaction Noted    Amoxicillin Rash 07/08/2018            The following portions of the patient's history were reviewed and updated as appropriate: allergies, current medications, past family history, past medical history, past social history, past surgical history and problem list.     Past Medical History:   Diagnosis Date    Allergic rhinitis        Past Surgical History:   Procedure Laterality Date    CIRCUMCISION      HYDROCELE EXCISION / REPAIR  2011       Family History   Problem Relation Age of Onset    Asthma Mother     Allergy (severe) Mother     Psoriasis Mother     Obesity Mother         had gastric sleeve    No Known Problems Father     Allergy (severe) Sister     Allergy (severe) Brother     Heart disease Maternal Grandmother     Hypertension Maternal Grandmother     Asthma Maternal Grandfather     Cancer Maternal Grandfather      COPD Maternal Grandfather     Hypertension Paternal Grandmother     Hypertension Paternal Grandfather     Heart disease Paternal Uncle     Hypertension Paternal Uncle     Hyperlipidemia Paternal Uncle          Medications have been verified.        Objective   /82   Pulse 73   Temp (!) 96.8 °F (36 °C)   Resp 16   Wt 114 kg (252 lb 3.2 oz)   SpO2 95%   BMI 41.97 kg/m²        Physical Exam     Physical Exam  Vitals and nursing note reviewed.   Constitutional:       General: He is not in acute distress.     Appearance: Normal appearance. He is obese. He is not ill-appearing, toxic-appearing or diaphoretic.   HENT:      Mouth/Throat:      Mouth: Mucous membranes are moist.      Pharynx: Oropharynx is clear. No oropharyngeal exudate or posterior oropharyngeal erythema.   Eyes:      General: No scleral icterus.     Extraocular Movements: Extraocular movements intact.      Conjunctiva/sclera: Conjunctivae normal.      Pupils: Pupils are equal, round, and reactive to light.   Cardiovascular:      Rate and Rhythm: Normal rate and regular rhythm.      Pulses: Normal pulses.   Pulmonary:      Effort: Pulmonary effort is normal.      Breath sounds: Normal breath sounds. No wheezing, rhonchi or rales.   Abdominal:      General: Bowel sounds are normal.      Palpations: There is no mass.      Tenderness: There is no abdominal tenderness. There is no guarding or rebound.   Musculoskeletal:         General: Normal range of motion.      Cervical back: Normal range of motion and neck supple.   Skin:     General: Skin is warm and dry.   Neurological:      Mental Status: He is alert and oriented to person, place, and time.      Coordination: Coordination normal.      Gait: Gait normal.   Psychiatric:         Mood and Affect: Mood normal.         Behavior: Behavior normal.

## 2024-08-14 NOTE — LETTER
August 14, 2024     Patient: Dedrick Cazares   YOB: 2005   Date of Visit: 8/14/2024       To Whom it May Concern:    Dedrick Cazares was seen in my clinic on 8/14/2024. He may return to work on 8/16/2024 .    If you have any questions or concerns, please don't hesitate to call.         Sincerely,          Salvatore Sol PA-C        CC: No Recipients

## 2025-05-09 ENCOUNTER — OFFICE VISIT (OUTPATIENT)
Dept: CARDIOLOGY CLINIC | Facility: CLINIC | Age: 20
End: 2025-05-09
Payer: COMMERCIAL

## 2025-05-09 VITALS
DIASTOLIC BLOOD PRESSURE: 80 MMHG | RESPIRATION RATE: 16 BRPM | OXYGEN SATURATION: 99 % | WEIGHT: 250 LBS | BODY MASS INDEX: 41.65 KG/M2 | HEIGHT: 65 IN | SYSTOLIC BLOOD PRESSURE: 130 MMHG | HEART RATE: 99 BPM

## 2025-05-09 DIAGNOSIS — I45.6 WPW (WOLFF-PARKINSON-WHITE SYNDROME): ICD-10-CM

## 2025-05-09 DIAGNOSIS — I25.10 ATHEROSCLEROSIS OF NATIVE CORONARY ARTERY OF NATIVE HEART WITHOUT ANGINA PECTORIS: ICD-10-CM

## 2025-05-09 PROCEDURE — 99213 OFFICE O/P EST LOW 20 MIN: CPT | Performed by: INTERNAL MEDICINE

## 2025-05-09 PROCEDURE — 93000 ELECTROCARDIOGRAM COMPLETE: CPT | Performed by: INTERNAL MEDICINE

## 2025-05-09 NOTE — PROGRESS NOTES
Consultation - Electrophysiology-Cardiology (EP)   Dedrick Cazares 20 y.o. male MRN: [id number]  Unit/Bed#:  Encounter: [id number]      1. WPW (Rbuio-Parkinson-White syndrome)  POCT ECG      2. Atherosclerosis of native coronary artery of native heart without angina pectoris  POCT ECG            Follow up of WPW pattern       Summary of my recommendation for the patient  WPW pattern-no tachycardia documented-on stress testing no antegrade conduction over pathway at higher heart rates -no intervention planned at this time  If situation changes and he has significant tachycardia, will consider medication/ablation    Chest pain-happens occasionally when he is bending over and cutting shrubs -can work through it  many times, stress test is negative for ischemia -referral to GI for evaluation of GERD    Obesity-BMI 41-needs dietary changes, follow-up with GI          Medical conditions  WPW pattern  Chest pain  Obesity  Suspected GERD/esophageal spasm          Assessment & Plan     WPW pattern  Patient does give a history of incidentally identified WPW pattern  +  on ZIO sinus tachycardia  He has never had high risk symptoms like presyncope, syncope, reproducible palpitation    Patient did undergo a stress test, relevant EKGs attached below  Preexcitation noted at baseline heart rate 80 and even at heart rate 133 but disappears at heart rate 176/min  As of now the pathway does not conduct at rate higher than the AV node    There is no documentation of pathway mediated tachycardia  Patient has not had any significant palpitations  No ablation is recommended at this time    If clinical condition changes and patient has sustained tachycardia, further intervention is indicated -beta-blocker/ablation      Baseline HR                            Chest pain  This is not suggestive of angina  BMI is 41 and this may be secondary to esophageal spasm/GERD  GI consult placed      Morbid obesity  BMI 41  Dietary changes  recommended    History of Present Illness   HPI: Dedrick Cazares is a 20 y.o. year old male has been referred to me by Dr Solomon for the evaluation and management of WPW pattern      The patient has significant medical illnesses which include  WPW pattern  Chest pain  Morbid obesity  Suspected esophageal spasm/reflux    Patient is not complaining of anginal-like chest pain  He has occasionally had chest pain when he is cutting shrubs, bending over  It would be right in the middle of the chest with some pain also over the left side  Sometimes he can walk through it  Sometimes he will sit and it will gradually go away    He is a     He is not complaining of palpitation, presyncope or syncope  He is not complaining of orthostatic lightheadedness  He is not complaining of orthopnea, PND, leg swelling  He is not complaining of exertional intolerance      He does not abuse tobacco or alcohol, energy drinks, drugs      Historical Information   Past Medical History:   Diagnosis Date    Allergic rhinitis      Past Surgical History:   Procedure Laterality Date    CIRCUMCISION      HYDROCELE EXCISION / REPAIR  2011     Social History     Substance and Sexual Activity   Alcohol Use Yes    Comment: occ     Social History     Substance and Sexual Activity   Drug Use Never     Social History     Tobacco Use   Smoking Status Never   Smokeless Tobacco Never     Social History     Socioeconomic History    Marital status: Single     Spouse name: Not on file    Number of children: Not on file    Years of education: Not on file    Highest education level: Not on file   Occupational History    Not on file   Tobacco Use    Smoking status: Never    Smokeless tobacco: Never   Vaping Use    Vaping status: Never Used   Substance and Sexual Activity    Alcohol use: Yes     Comment: occ    Drug use: Never    Sexual activity: Not Currently     Partners: Female   Other Topics Concern    Not on file   Social History Narrative    Lives with  mom,  dad, sister and  brother    Smoke/CO detectors in home     Pets- 4 cats and 1 dog    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              Social Drivers of Health     Financial Resource Strain: Low Risk  (7/18/2024)    Received from [facility]    Overall Financial Resource Strain (CARDIA)     Difficulty of Paying Living Expenses: Not hard at all   Food Insecurity: No Food Insecurity (7/18/2024)    Received from [facility]    Hunger Vital Sign     Worried About Running Out of Food in the Last Year: Never true     Ran Out of Food in the Last Year: Never true   Transportation Needs: No Transportation Needs (7/18/2024)    Received from [facility]    PRAPARE - Transportation     Lack of Transportation (Medical): No     Lack of Transportation (Non-Medical): No   Physical Activity: Not on file   Stress: Not on file   Social Connections: Not on file   Intimate Partner Violence: Not At Risk (7/18/2024)    [facility] from [facility], [facility]    Humiliation, Afraid, Rape, and Kick questionnaire     Fear of Current or Ex-Partner: No     Emotionally Abused: No     Physically Abused: No     Sexually Abused: No   Housing Stability: Low Risk  (7/18/2024)    Received from [facility]    Housing Stability Vital Sign     Unable to Pay for Housing in the Last Year: No     Number of Times Moved in the Last Year: 1     Homeless in the Last Year: No     .  Family History:  Family History   Problem Relation Age of Onset    Asthma Mother     Allergy (severe) Mother     Psoriasis Mother     Obesity Mother         had gastric sleeve    No Known Problems Father     Allergy (severe) Sister     Allergy (severe) Brother     Heart disease Maternal Grandmother     Hypertension Maternal Grandmother     Asthma Maternal Grandfather     Cancer Maternal Grandfather     COPD Maternal Grandfather     Hypertension Paternal  "Grandmother     Hypertension Paternal Grandfather     Heart disease Paternal Uncle     Hypertension Paternal Uncle     Hyperlipidemia Paternal Uncle          Meds/Allergies      No current facility-administered medications for this visit.       Not in a hospital admission.    Allergies   Allergen Reactions    Amoxicillin Rash           Objective   Vitals: Visit Vitals  /80 (BP Location: Left arm, Patient Position: Sitting, Cuff Size: Standard)   Pulse 99   Resp 16   Ht 5' 5\" (1.651 m)   Wt 113 kg (250 lb)   SpO2 99%   BMI 41.60 kg/m²   Smoking Status Never   BSA 2.17 m²      Vitals:    05/09/25 1000   Weight: 113 kg (250 lb)   [unfilled]    Invasive Devices       None                     ROS  Review of Systems   All other systems reviewed and are negative.  As described in my history of present illness        PHYSICAL EXAM  Physical Exam  Vitals reviewed.   Constitutional:       General: He is not in acute distress.     Appearance: Normal appearance. He is obese. He is not ill-appearing.      Comments: BMI is 41   HENT:      Head: Normocephalic and atraumatic.      Right Ear: External ear normal.      Left Ear: External ear normal.      Nose: Nose normal.      Mouth/Throat:      Comments: Posterior pharynx is crowded  Eyes:      General: No scleral icterus.     Extraocular Movements: Extraocular movements intact.      Conjunctiva/sclera: Conjunctivae normal.      Pupils: Pupils are equal, round, and reactive to light.   Neck:      Comments: Large thick neck  Cardiovascular:      Rate and Rhythm: Normal rate and regular rhythm.      Heart sounds: Normal heart sounds. No murmur heard.     No gallop.   Pulmonary:      Effort: No respiratory distress.      Breath sounds: Normal breath sounds. No wheezing.   Abdominal:      General: Bowel sounds are normal. There is no distension.      Palpations: Abdomen is soft.      Tenderness: There is no abdominal tenderness. There is no guarding.      Comments: Central obesity " present   Musculoskeletal:         General: No swelling or deformity.      Cervical back: Neck supple. No rigidity.   Skin:     Coloration: Skin is not jaundiced.      Findings: No bruising or lesion.   Neurological:      Mental Status: He is alert and oriented to person, place, and time. Mental status is at baseline.      Motor: No weakness.   Psychiatric:         Mood and Affect: Mood normal.         Behavior: Behavior normal.         Thought Content: Thought content normal.         Judgment: Judgment normal.               LAB RESULTS:    CBC:  WBC   Date Value Ref Range Status   03/07/2024 7.99 4.31 - 10.16 Thousand/uL Final     Hemoglobin   Date Value Ref Range Status   03/07/2024 15.8 12.0 - 17.0 g/dL Final     Hematocrit   Date Value Ref Range Status   03/07/2024 45.0 36.5 - 49.3 % Final     MCV   Date Value Ref Range Status   03/07/2024 83 82 - 98 fL Final     Platelets   Date Value Ref Range Status   03/07/2024 309 149 - 390 Thousands/uL Final     RBC   Date Value Ref Range Status   03/07/2024 5.41 3.88 - 5.62 Million/uL Final     MCH   Date Value Ref Range Status   03/07/2024 29.2 26.8 - 34.3 pg Final     MCHC   Date Value Ref Range Status   03/07/2024 35.1 31.4 - 37.4 g/dL Final     RDW   Date Value Ref Range Status   03/07/2024 12.7 11.6 - 15.1 % Final     MPV   Date Value Ref Range Status   03/07/2024 9.9 8.9 - 12.7 fL Final     nRBC   Date Value Ref Range Status   03/07/2024 0 /100 WBCs Final       CMP:  Potassium   Date Value Ref Range Status   07/18/2024 3.5 3.5 - 5.2 mmol/L Final     Chloride   Date Value Ref Range Status   07/18/2024 107 100 - 109 mmol/L Final     Carbon Dioxide   Date Value Ref Range Status   07/18/2024 26 21 - 31 mmol/L Final     BUN   Date Value Ref Range Status   07/18/2024 11 7 - 28 mg/dL Final     Creatinine   Date Value Ref Range Status   07/18/2024 0.81 0.53 - 1.30 mg/dL Final     Calcium   Date Value Ref Range Status   07/18/2024 8.7 8.5 - 10.1 mg/dL Final     AST   Date  "Value Ref Range Status   2024 19 <41 U/L Final     ALT   Date Value Ref Range Status   2024 25 <56 U/L Final     Alkaline Phosphatase   Date Value Ref Range Status   2024 48 35 - 120 U/L Final     eGFRcr   Date Value Ref Range Status   2024 128 >59 Final        Magnesium:   Magnesium   Date Value Ref Range Status   2024 1.9 1.4 - 2.2 mg/dL Final        A1C:  Hemoglobin A1C   Date Value Ref Range Status   2019 4.8 4.2 - 6.3 % Final        TSH:  TSH 3RD GENERATON   Date Value Ref Range Status   2019 2.530 0.463 - 3.980 uIU/mL Final     Comment:       Using supplements with high doses of biotin 20 to more than 300 times greater than the adequate daily intake for adults of 30 mcg/day as established by the [place] [employer], can cause falsely depress results.        PT/INR:  INR   Date Value Ref Range Status   2024 1.0  Final     Comment:     Interpretation  Suggested INR ranges for various  clinical conditions:                                           INR  Ambulatory Surgery          <1.5  Coumadinized Patients             (DVT,PE,MI or A.Fib)     2.0-3.0  Mechanical Heart Valve   2.5-3.5  Cardiogenic Embolus      2.5-3.5       Lipid Panel:  No results found for: \"CHOLESTEROL\", \"TRIG\", \"HDL\", \"LDLCAL\", \"NONHDLC\"    Troponin:  No results found for: \"TROPONINI\"      Imaging:    [id number]        BRETT:  No results found for this or any previous visit.      Echocardiogram:  Results for orders placed during the hospital encounter of 24    Echo complete w/ contrast if indicated    Interpretation Summary    Left Ventricle: Left ventricular cavity size is normal. Wall thickness is normal. The left ventricular ejection fraction is 60%. Systolic function is normal. Wall motion is normal. Diastolic function is normal.      Stress Test:   Results for orders placed during the hospital encounter of 24    Stress test only, exercise    Interpretation Summary  1.  " Somewhat limited exercise tolerance due to shortness of breath  2.  Resting delta wave most prominent in lead I which persisted throughout the study.  3.  Flat J-point depression in lead I only at maximum exercise-questionable significance given accessory conduction pathway most prominent in lead I  4.  No significant rhythm abnormality.  5.  No chest discomfort      Cardiac Catheterization:  No results found for this or any previous visit.      HOLTER MONITOR: 24 HOUR/48 HOUR MONITORS  No results found for this or any previous visit.      AMB Extended Holter Monitor: Zio XT/AT or BioTel      AMB event recorder    BioTel  04/22/2024 - 04/28/2024   Juanito Sheridan MD  4/30/2024  4:47 PM EDT       [facility]     Event Recorder Results     Indication: WPW     Duration of monitoring: 3d 5h 43m     Results:  Predominant underlying rhythm: Normal sinus rhythm  Average heart rate: 89 bpm  Ventricular preexcitation was predominantly noted.  Episodes of tachycardia were noted.  Most appeared consistent with sinus tachycardia however SVT cannot be definitively excluded.  Onset and termination were not noted.                     DEVICE CHECK:     No results found for this or any previous visit.           Code Status: [unfilled]  Advance Directive and Living Will:      Power of :    POLST:      Counseling / Coordination of Care  Detailed discussion done with the patient with regards to WPW pattern      Georgi Durand MD

## 2025-05-09 NOTE — LETTER
May 9, 2025     Dedrick Cazares  [address]  [facility] [address]    Patient: Dedrick Cazares   YOB: 2005   Date of Visit: 5/9/2025       Dear Dr. Dedrick Carrasco MD:    Thank you for referring Dedrick Cazares to me for evaluation. Below are my notes for this consultation.    If you have questions, please do not hesitate to call me. I look forward to following your patient along with you.         Sincerely,        Georgi Durand MD        CC: MD Georgi Moe MD  5/9/2025 11:20 AM  Sign when Signing Visit   Consultation - Electrophysiology-Cardiology (EP)   Dedrick Cazares 20 y.o. male MRN: [id number]  Unit/Bed#:  Encounter: [id number]      1. WPW (Rubio-Parkinson-White syndrome)  POCT ECG      2. Atherosclerosis of native coronary artery of native heart without angina pectoris  POCT ECG            Follow up of WPW pattern       Summary of my recommendation for the patient  WPW pattern-no tachycardia documented-on stress testing no antegrade conduction over pathway at higher heart rates -no intervention planned at this time  If situation changes and he has significant tachycardia, will consider medication/ablation    Chest pain-happens occasionally when he is bending over and cutting shrubs -can work through it  many times, stress test is negative for ischemia -referral to GI for evaluation of GERD    Obesity-BMI 41-needs dietary changes, follow-up with GI          Medical conditions  WPW pattern  Chest pain  Obesity  Suspected GERD/esophageal spasm          Assessment & Plan    WPW pattern  Patient does give a history of incidentally identified WPW pattern  +  on ZIO sinus tachycardia  He has never had high risk symptoms like presyncope, syncope, reproducible palpitation    Patient did undergo a stress test, relevant EKGs attached below  Preexcitation noted at baseline heart rate 80 and even at heart rate 133 but disappears at heart rate 176/min  As of now  the pathway does not conduct at rate higher than the AV node    There is no documentation of pathway mediated tachycardia  Patient has not had any significant palpitations  No ablation is recommended at this time    If clinical condition changes and patient has sustained tachycardia, further intervention is indicated -beta-blocker/ablation      Baseline HR                            Chest pain  This is not suggestive of angina  BMI is 41 and this may be secondary to esophageal spasm/GERD  GI consult placed      Morbid obesity  BMI 41  Dietary changes recommended    History of Present Illness  HPI: Dedrick Cazares is a 20 y.o. year old male has been referred to me by Dr Solomon for the evaluation and management of WPW pattern      The patient has significant medical illnesses which include  WPW pattern  Chest pain  Morbid obesity  Suspected esophageal spasm/reflux    Patient is not complaining of anginal-like chest pain  He has occasionally had chest pain when he is cutting shrubs, bending over  It would be right in the middle of the chest with some pain also over the left side  Sometimes he can walk through it  Sometimes he will sit and it will gradually go away    He is a     He is not complaining of palpitation, presyncope or syncope  He is not complaining of orthostatic lightheadedness  He is not complaining of orthopnea, PND, leg swelling  He is not complaining of exertional intolerance      He does not abuse tobacco or alcohol, energy drinks, drugs      Historical Information  Past Medical History:   Diagnosis Date   • Allergic rhinitis      Past Surgical History:   Procedure Laterality Date   • CIRCUMCISION     • HYDROCELE EXCISION / REPAIR  2011     Social History     Substance and Sexual Activity   Alcohol Use Yes    Comment: occ     Social History     Substance and Sexual Activity   Drug Use Never     Social History     Tobacco Use   Smoking Status Never   Smokeless Tobacco Never     Social  History     Socioeconomic History   • Marital status: Single     Spouse name: Not on file   • Number of children: Not on file   • Years of education: Not on file   • Highest education level: Not on file   Occupational History   • Not on file   Tobacco Use   • Smoking status: Never   • Smokeless tobacco: Never   Vaping Use   • Vaping status: Never Used   Substance and Sexual Activity   • Alcohol use: Yes     Comment: occ   • Drug use: Never   • Sexual activity: Not Currently     Partners: Female   Other Topics Concern   • Not on file   Social History Narrative    Lives with mom,  dad, sister and  brother    Smoke/CO detectors in home     Pets- 4 cats and 1 dog    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              Social Drivers of Health     Financial Resource Strain: Low Risk  (7/18/2024)    Received from [facility]    Overall Financial Resource Strain (CARDIA)    • Difficulty of Paying Living Expenses: Not hard at all   Food Insecurity: No Food Insecurity (7/18/2024)    Received from [facility]    Hunger Vital Sign    • Worried About Running Out of Food in the Last Year: Never true    • Ran Out of Food in the Last Year: Never true   Transportation Needs: No Transportation Needs (7/18/2024)    Received from [facility]    PRAPARE - Transportation    • Lack of Transportation (Medical): No    • Lack of Transportation (Non-Medical): No   Physical Activity: Not on file   Stress: Not on file   Social Connections: Not on file   Intimate Partner Violence: Not At Risk (7/18/2024)    [facility] from [facility], [facility]    Humiliation, Afraid, Rape, and Kick questionnaire    • Fear of Current or Ex-Partner: No    • Emotionally Abused: No    • Physically Abused: No    • Sexually Abused: No   Housing Stability: Low Risk  (7/18/2024)    Received from [facility]    Housing Stability Vital Sign    • Unable  "to Pay for Housing in the Last Year: No    • Number of Times Moved in the Last Year: 1    • Homeless in the Last Year: No     .  Family History:  Family History   Problem Relation Age of Onset   • Asthma Mother    • Allergy (severe) Mother    • Psoriasis Mother    • Obesity Mother         had gastric sleeve   • No Known Problems Father    • Allergy (severe) Sister    • Allergy (severe) Brother    • Heart disease Maternal Grandmother    • Hypertension Maternal Grandmother    • Asthma Maternal Grandfather    • Cancer Maternal Grandfather    • COPD Maternal Grandfather    • Hypertension Paternal Grandmother    • Hypertension Paternal Grandfather    • Heart disease Paternal Uncle    • Hypertension Paternal Uncle    • Hyperlipidemia Paternal Uncle          Meds/Allergies     No current facility-administered medications for this visit.       Not in a hospital admission.    Allergies   Allergen Reactions   • Amoxicillin Rash           Objective  Vitals: Visit Vitals  /80 (BP Location: Left arm, Patient Position: Sitting, Cuff Size: Standard)   Pulse 99   Resp 16   Ht 5' 5\" (1.651 m)   Wt 113 kg (250 lb)   SpO2 99%   BMI 41.60 kg/m²   Smoking Status Never   BSA 2.17 m²      Vitals:    05/09/25 1000   Weight: 113 kg (250 lb)   [unfilled]    Invasive Devices       None                     ROS  Review of Systems   All other systems reviewed and are negative.  As described in my history of present illness        PHYSICAL EXAM  Physical Exam  Vitals reviewed.   Constitutional:       General: He is not in acute distress.     Appearance: Normal appearance. He is obese. He is not ill-appearing.      Comments: BMI is 41   HENT:      Head: Normocephalic and atraumatic.      Right Ear: External ear normal.      Left Ear: External ear normal.      Nose: Nose normal.      Mouth/Throat:      Comments: Posterior pharynx is crowded  Eyes:      General: No scleral icterus.     Extraocular Movements: Extraocular movements intact.      " Conjunctiva/sclera: Conjunctivae normal.      Pupils: Pupils are equal, round, and reactive to light.   Neck:      Comments: Large thick neck  Cardiovascular:      Rate and Rhythm: Normal rate and regular rhythm.      Heart sounds: Normal heart sounds. No murmur heard.     No gallop.   Pulmonary:      Effort: No respiratory distress.      Breath sounds: Normal breath sounds. No wheezing.   Abdominal:      General: Bowel sounds are normal. There is no distension.      Palpations: Abdomen is soft.      Tenderness: There is no abdominal tenderness. There is no guarding.      Comments: Central obesity present   Musculoskeletal:         General: No swelling or deformity.      Cervical back: Neck supple. No rigidity.   Skin:     Coloration: Skin is not jaundiced.      Findings: No bruising or lesion.   Neurological:      Mental Status: He is alert and oriented to person, place, and time. Mental status is at baseline.      Motor: No weakness.   Psychiatric:         Mood and Affect: Mood normal.         Behavior: Behavior normal.         Thought Content: Thought content normal.         Judgment: Judgment normal.               LAB RESULTS:    CBC:  WBC   Date Value Ref Range Status   03/07/2024 7.99 4.31 - 10.16 Thousand/uL Final     Hemoglobin   Date Value Ref Range Status   03/07/2024 15.8 12.0 - 17.0 g/dL Final     Hematocrit   Date Value Ref Range Status   03/07/2024 45.0 36.5 - 49.3 % Final     MCV   Date Value Ref Range Status   03/07/2024 83 82 - 98 fL Final     Platelets   Date Value Ref Range Status   03/07/2024 309 149 - 390 Thousands/uL Final     RBC   Date Value Ref Range Status   03/07/2024 5.41 3.88 - 5.62 Million/uL Final     MCH   Date Value Ref Range Status   03/07/2024 29.2 26.8 - 34.3 pg Final     MCHC   Date Value Ref Range Status   03/07/2024 35.1 31.4 - 37.4 g/dL Final     RDW   Date Value Ref Range Status   03/07/2024 12.7 11.6 - 15.1 % Final     MPV   Date Value Ref Range Status   03/07/2024 9.9 8.9 -  "12.7 fL Final     nRBC   Date Value Ref Range Status   03/07/2024 0 /100 WBCs Final       CMP:  Potassium   Date Value Ref Range Status   07/18/2024 3.5 3.5 - 5.2 mmol/L Final     Chloride   Date Value Ref Range Status   07/18/2024 107 100 - 109 mmol/L Final     Carbon Dioxide   Date Value Ref Range Status   07/18/2024 26 21 - 31 mmol/L Final     BUN   Date Value Ref Range Status   07/18/2024 11 7 - 28 mg/dL Final     Creatinine   Date Value Ref Range Status   07/18/2024 0.81 0.53 - 1.30 mg/dL Final     Calcium   Date Value Ref Range Status   07/18/2024 8.7 8.5 - 10.1 mg/dL Final     AST   Date Value Ref Range Status   07/18/2024 19 <41 U/L Final     ALT   Date Value Ref Range Status   07/18/2024 25 <56 U/L Final     Alkaline Phosphatase   Date Value Ref Range Status   07/18/2024 48 35 - 120 U/L Final     eGFRcr   Date Value Ref Range Status   07/18/2024 128 >59 Final        Magnesium:   Magnesium   Date Value Ref Range Status   07/18/2024 1.9 1.4 - 2.2 mg/dL Final        A1C:  Hemoglobin A1C   Date Value Ref Range Status   07/08/2019 4.8 4.2 - 6.3 % Final        TSH:  TSH 3RD GENERATON   Date Value Ref Range Status   07/08/2019 2.530 0.463 - 3.980 uIU/mL Final     Comment:       Using supplements with high doses of biotin 20 to more than 300 times greater than the adequate daily intake for adults of 30 mcg/day as established by the [employer], can cause falsely depress results.        PT/INR:  INR   Date Value Ref Range Status   07/17/2024 1.0  Final     Comment:     Interpretation  Suggested INR ranges for various  clinical conditions:                                           INR  Ambulatory Surgery          <1.5  Coumadinized Patients             (DVT,PE,MI or A.Fib)     2.0-3.0  Mechanical Heart Valve   2.5-3.5  Cardiogenic Embolus      2.5-3.5       Lipid Panel:  No results found for: \"CHOLESTEROL\", \"TRIG\", \"HDL\", \"LDLCAL\", \"NONHDLC\"    Troponin:  No results found for: " "\"TROPONINI\"      Imaging:    [id number]        BRETT:  No results found for this or any previous visit.      Echocardiogram:  Results for orders placed during the hospital encounter of 24    Echo complete w/ contrast if indicated    Interpretation Summary  •  Left Ventricle: Left ventricular cavity size is normal. Wall thickness is normal. The left ventricular ejection fraction is 60%. Systolic function is normal. Wall motion is normal. Diastolic function is normal.      Stress Test:   Results for orders placed during the hospital encounter of 24    Stress test only, exercise    Interpretation Summary  1.  Somewhat limited exercise tolerance due to shortness of breath  2.  Resting delta wave most prominent in lead I which persisted throughout the study.  3.  Flat J-point depression in lead I only at maximum exercise-questionable significance given accessory conduction pathway most prominent in lead I  4.  No significant rhythm abnormality.  5.  No chest discomfort      Cardiac Catheterization:  No results found for this or any previous visit.      HOLTER MONITOR: 24 HOUR/48 HOUR MONITORS  No results found for this or any previous visit.      AMB Extended Holter Monitor: [email or web address] XT/AT or [email or web address]      AMB event recorder    [employer]  2024 - 2024   Juanito Sheridan MD  2024  4:47 PM EDT       [facility]     Event Recorder Results     Indication: WPW     Duration of monitoring: 3d 5h 43m     Results:  Predominant underlying rhythm: Normal sinus rhythm  Average heart rate: 89 bpm  Ventricular preexcitation was predominantly noted.  Episodes of tachycardia were noted.  Most appeared consistent with sinus tachycardia however SVT cannot be definitively excluded.  Onset and termination were not noted.                     DEVICE CHECK:     No results found for this or any previous visit.           Code Status: [unfilled]  Advance Directive and Living Will:      Power of " :    POLST:      Counseling / Coordination of Care  Detailed discussion done with the patient with regards to WPW pattern      Georgi Durand MD